# Patient Record
Sex: MALE | Race: WHITE | NOT HISPANIC OR LATINO | Employment: OTHER | URBAN - METROPOLITAN AREA
[De-identification: names, ages, dates, MRNs, and addresses within clinical notes are randomized per-mention and may not be internally consistent; named-entity substitution may affect disease eponyms.]

---

## 2021-10-03 ENCOUNTER — HOSPITAL ENCOUNTER (EMERGENCY)
Facility: HOSPITAL | Age: 61
Discharge: ANOTHER HOSPITAL | End: 2021-10-03
Attending: EMERGENCY MEDICINE | Admitting: EMERGENCY MEDICINE

## 2021-10-03 ENCOUNTER — APPOINTMENT (OUTPATIENT)
Dept: GENERAL RADIOLOGY | Facility: HOSPITAL | Age: 61
End: 2021-10-03

## 2021-10-03 VITALS
RESPIRATION RATE: 18 BRPM | DIASTOLIC BLOOD PRESSURE: 92 MMHG | WEIGHT: 255.73 LBS | HEART RATE: 85 BPM | HEIGHT: 75 IN | SYSTOLIC BLOOD PRESSURE: 137 MMHG | TEMPERATURE: 98 F | BODY MASS INDEX: 31.8 KG/M2 | OXYGEN SATURATION: 97 %

## 2021-10-03 DIAGNOSIS — I48.91 NEW ONSET ATRIAL FIBRILLATION (HCC): Primary | ICD-10-CM

## 2021-10-03 LAB
ALBUMIN SERPL-MCNC: 4.4 G/DL (ref 3.5–5.2)
ALBUMIN/GLOB SERPL: 2 G/DL
ALP SERPL-CCNC: 92 U/L (ref 39–117)
ALT SERPL W P-5'-P-CCNC: 19 U/L (ref 1–41)
ANION GAP SERPL CALCULATED.3IONS-SCNC: 9.5 MMOL/L (ref 5–15)
AST SERPL-CCNC: 19 U/L (ref 1–40)
BASOPHILS # BLD AUTO: 0.06 10*3/MM3 (ref 0–0.2)
BASOPHILS NFR BLD AUTO: 0.8 % (ref 0–1.5)
BILIRUB SERPL-MCNC: 0.5 MG/DL (ref 0–1.2)
BUN SERPL-MCNC: 13 MG/DL (ref 8–23)
BUN/CREAT SERPL: 11.4 (ref 7–25)
CALCIUM SPEC-SCNC: 9.3 MG/DL (ref 8.6–10.5)
CHLORIDE SERPL-SCNC: 105 MMOL/L (ref 98–107)
CO2 SERPL-SCNC: 24.5 MMOL/L (ref 22–29)
CREAT SERPL-MCNC: 1.14 MG/DL (ref 0.76–1.27)
DEPRECATED RDW RBC AUTO: 39.3 FL (ref 37–54)
EOSINOPHIL # BLD AUTO: 0.26 10*3/MM3 (ref 0–0.4)
EOSINOPHIL NFR BLD AUTO: 3.6 % (ref 0.3–6.2)
ERYTHROCYTE [DISTWIDTH] IN BLOOD BY AUTOMATED COUNT: 12.3 % (ref 12.3–15.4)
GFR SERPL CREATININE-BSD FRML MDRD: 66 ML/MIN/1.73
GLOBULIN UR ELPH-MCNC: 2.2 GM/DL
GLUCOSE SERPL-MCNC: 127 MG/DL (ref 65–99)
HCT VFR BLD AUTO: 45 % (ref 37.5–51)
HGB BLD-MCNC: 15.9 G/DL (ref 13–17.7)
HOLD SPECIMEN: NORMAL
HOLD SPECIMEN: NORMAL
IMM GRANULOCYTES # BLD AUTO: 0.03 10*3/MM3 (ref 0–0.05)
IMM GRANULOCYTES NFR BLD AUTO: 0.4 % (ref 0–0.5)
LYMPHOCYTES # BLD AUTO: 2.19 10*3/MM3 (ref 0.7–3.1)
LYMPHOCYTES NFR BLD AUTO: 30.7 % (ref 19.6–45.3)
MCH RBC QN AUTO: 31 PG (ref 26.6–33)
MCHC RBC AUTO-ENTMCNC: 35.3 G/DL (ref 31.5–35.7)
MCV RBC AUTO: 87.7 FL (ref 79–97)
MONOCYTES # BLD AUTO: 0.61 10*3/MM3 (ref 0.1–0.9)
MONOCYTES NFR BLD AUTO: 8.6 % (ref 5–12)
NEUTROPHILS NFR BLD AUTO: 3.98 10*3/MM3 (ref 1.7–7)
NEUTROPHILS NFR BLD AUTO: 55.9 % (ref 42.7–76)
NRBC BLD AUTO-RTO: 0 /100 WBC (ref 0–0.2)
NT-PROBNP SERPL-MCNC: 398 PG/ML (ref 0–900)
PLATELET # BLD AUTO: 206 10*3/MM3 (ref 140–450)
PMV BLD AUTO: 10.3 FL (ref 6–12)
POTASSIUM SERPL-SCNC: 4 MMOL/L (ref 3.5–5.2)
PROT SERPL-MCNC: 6.6 G/DL (ref 6–8.5)
RBC # BLD AUTO: 5.13 10*6/MM3 (ref 4.14–5.8)
SODIUM SERPL-SCNC: 139 MMOL/L (ref 136–145)
TROPONIN T SERPL-MCNC: <0.01 NG/ML (ref 0–0.03)
WBC # BLD AUTO: 7.13 10*3/MM3 (ref 3.4–10.8)
WHOLE BLOOD HOLD SPECIMEN: NORMAL
WHOLE BLOOD HOLD SPECIMEN: NORMAL

## 2021-10-03 PROCEDURE — 80053 COMPREHEN METABOLIC PANEL: CPT | Performed by: EMERGENCY MEDICINE

## 2021-10-03 PROCEDURE — 84484 ASSAY OF TROPONIN QUANT: CPT | Performed by: EMERGENCY MEDICINE

## 2021-10-03 PROCEDURE — 83880 ASSAY OF NATRIURETIC PEPTIDE: CPT | Performed by: EMERGENCY MEDICINE

## 2021-10-03 PROCEDURE — 93005 ELECTROCARDIOGRAM TRACING: CPT | Performed by: EMERGENCY MEDICINE

## 2021-10-03 PROCEDURE — 71045 X-RAY EXAM CHEST 1 VIEW: CPT

## 2021-10-03 PROCEDURE — 85025 COMPLETE CBC W/AUTO DIFF WBC: CPT | Performed by: EMERGENCY MEDICINE

## 2021-10-03 PROCEDURE — 99284 EMERGENCY DEPT VISIT MOD MDM: CPT

## 2021-10-03 RX ORDER — SODIUM CHLORIDE 0.9 % (FLUSH) 0.9 %
10 SYRINGE (ML) INJECTION AS NEEDED
Status: DISCONTINUED | OUTPATIENT
Start: 2021-10-03 | End: 2021-10-03 | Stop reason: HOSPADM

## 2021-10-03 NOTE — ED PROVIDER NOTES
Subjective   This patient presents to the emergency department complaining of chest discomfort and irregular heartbeat for the last day to 2 days.  The patient [facility] [place]  Patient states over the last couple of days he started developing palpitations he is also had intermittent chest discomfort and some shortness of breath nausea lightheadedness.          Review of Systems   Constitutional: Negative.    HENT: Negative.    Eyes: Negative.    Respiratory: Positive for chest tightness and shortness of breath. Negative for apnea, cough, choking, wheezing and stridor.    Cardiovascular: Positive for chest pain and palpitations.   Gastrointestinal: Positive for nausea. Negative for abdominal distention, abdominal pain, anal bleeding, blood in stool, constipation, diarrhea, rectal pain and vomiting.   Endocrine: Negative.    Genitourinary: Negative.    Musculoskeletal: Negative.    Skin: Negative.    Allergic/Immunologic: Negative.    Neurological: Negative.    Hematological: Negative.    Psychiatric/Behavioral: Negative.        Past Medical History:   Diagnosis Date   • Coronary artery disease        Allergies   Allergen Reactions   • Depakote [Valproic Acid] Anaphylaxis   • Simvastatin Unknown - Low Severity       Past Surgical History:   Procedure Laterality Date   • CARDIAC SURGERY         History reviewed. No pertinent family history.    Social History     Socioeconomic History   • Marital status:      Spouse name: Not on file   • Number of children: Not on file   • Years of education: Not on file   • Highest education level: Not on file   Tobacco Use   • Smoking status: Current Every Day Smoker   Substance and Sexual Activity   • Alcohol use: Not Currently   • Drug use: Yes     Types: Marijuana           Objective   Physical Exam  Constitutional:       Appearance: Normal appearance.   HENT:      Head: Normocephalic and atraumatic.      Right Ear:  External ear normal.      Left Ear: External ear normal.      Nose: Nose normal.      Mouth/Throat:      Mouth: Mucous membranes are moist.      Pharynx: Oropharynx is clear.   Eyes:      Extraocular Movements: Extraocular movements intact.      Conjunctiva/sclera: Conjunctivae normal.      Pupils: Pupils are equal, round, and reactive to light.   Cardiovascular:      Rate and Rhythm: Normal rate. Rhythm irregular.      Pulses: Normal pulses.      Heart sounds: Normal heart sounds.   Pulmonary:      Effort: Pulmonary effort is normal.      Breath sounds: Normal breath sounds.   Abdominal:      General: Abdomen is flat. Bowel sounds are normal.      Palpations: Abdomen is soft.   Musculoskeletal:         General: Normal range of motion.      Cervical back: Normal range of motion and neck supple.   Skin:     General: Skin is warm and dry.      Capillary Refill: Capillary refill takes less than 2 seconds.   Neurological:      General: No focal deficit present.      Mental Status: He is alert and oriented to person, place, and time.   Psychiatric:         Mood and Affect: Mood normal.         Behavior: Behavior normal.         Thought Content: Thought content normal.         Procedures           ED Course    Labs Reviewed   COMPREHENSIVE METABOLIC PANEL - Abnormal; Notable for the following components:       Result Value    Glucose 127 (*)     All other components within normal limits    Narrative:     GFR Normal >60  Chronic Kidney Disease <60  Kidney Failure <15     BNP (IN-HOUSE) - Normal    Narrative:     Among patients with dyspnea, NT-proBNP is highly sensitive for the detection of acute congestive heart failure. In addition NT-proBNP of <300 pg/ml effectively rules out acute congestive heart failure with 99% negative predictive value.    Results may be falsely decreased if patient taking Biotin.     TROPONIN (IN-HOUSE) - Normal    Narrative:     Troponin T Reference Range:  <= 0.03 ng/mL-   Negative for  AMI  >0.03 ng/mL-     Abnormal for myocardial necrosis.  Clinicians would have to utilize clinical acumen, EKG, Troponin and serial changes to determine if it is an Acute Myocardial Infarction or myocardial injury due to an underlying chronic condition.       Results may be falsely decreased if patient taking Biotin.     CBC WITH AUTO DIFFERENTIAL - Normal   RAINBOW DRAW    Narrative:     The following orders were created for panel order [place] Draw.  Procedure                               Abnormality         Status                     ---------                               -----------         ------                     Green Top (Gel)[[id number]]                                  Final result               Lavender Top[[id number]]                                     Final result               Gold Top - SST[[id number]]                                   Final result               Light Blue Top[[id number]]                                   Final result                 Please view results for these tests on the individual orders.   CBC AND DIFFERENTIAL    Narrative:     The following orders were created for panel order CBC & Differential.  Procedure                               Abnormality         Status                     ---------                               -----------         ------                     CBC Auto Differential[[id number]]        Normal              Final result                 Please view results for these tests on the individual orders.   GREEN TOP   LAVENDER TOP   GOLD TOP - SST   LIGHT BLUE TOP     XR Chest 1 View    Result Date: 10/3/2021  Narrative: PROCEDURE: XR CHEST 1 VW  COMPARISON: [facility], CR, CHEST PA/AP & LAT 2V, 12/14/2016, 21:16.  INDICATIONS: SOA  FINDINGS:  There are bibasilar areas of atelectasis.  The heart is not enlarged.  There are no pleural effusions.  CONCLUSION:  1. There are some atelectatic changes in the lower lungs.       MANINDER GIBSON MD        Electronically Signed and Approved By: MANINDER GIBSON MD on 10/03/2021 at 18:36               EKG: Atrial fibrillation with a rate of 76  Abnormal P wave and UT interval  Abnormal QRS  Normal axis  Nonspecific ST changes  Normal QT QTC                                           MDM  Number of Diagnoses or Management Options  New onset atrial fibrillation (HCC)  Diagnosis management comments: I spoke with Dr. Agustin Valadez at the [facility] who accepts the patient for transfer.  I did review his prior records with Dr. Valadez.  I also spoke with patient's wife.       Amount and/or Complexity of Data Reviewed  Clinical lab tests: reviewed  Tests in the radiology section of CPT®: reviewed  Tests in the medicine section of CPT®: reviewed  Decide to obtain previous medical records or to obtain history from someone other than the patient: yes  Obtain history from someone other than the patient: yes  Review and summarize past medical records: yes  Discuss the patient with other providers: yes  Independent visualization of images, tracings, or specimens: yes    Patient Progress  Patient progress: stable      Final diagnoses:   New onset atrial fibrillation (HCC)       ED Disposition  ED Disposition     ED Disposition Condition Comment    Transfer to Another Facility   [facility] [place]          No follow-up provider specified.       Medication List      No changes were made to your prescriptions during this visit.          Alexander Garcia, DO  10/04/21 1454

## 2021-10-21 LAB — QT INTERVAL: 375 MS

## 2021-11-18 ENCOUNTER — OFFICE VISIT (OUTPATIENT)
Dept: CARDIAC REHAB | Facility: HOSPITAL | Age: 61
End: 2021-11-18

## 2021-11-18 VITALS — DIASTOLIC BLOOD PRESSURE: 80 MMHG | SYSTOLIC BLOOD PRESSURE: 140 MMHG | HEART RATE: 64 BPM

## 2021-11-18 DIAGNOSIS — Z98.61 PERCUTANEOUS TRANSLUMINAL CORONARY ANGIOPLASTY (PTCA) WITHIN LAST 14 TO 24 MONTHS: Primary | ICD-10-CM

## 2021-11-18 PROCEDURE — 93798 PHYS/QHP OP CAR RHAB W/ECG: CPT

## 2021-11-18 RX ORDER — LITHIUM CARBONATE 300 MG/1
450 CAPSULE ORAL 2 TIMES DAILY WITH MEALS
COMMUNITY

## 2021-11-18 RX ORDER — OMEPRAZOLE 20 MG/1
20 CAPSULE, DELAYED RELEASE ORAL DAILY
COMMUNITY

## 2021-11-18 RX ORDER — NICOTINE 21 MG/24HR
1 PATCH, TRANSDERMAL 24 HOURS TRANSDERMAL EVERY 24 HOURS
COMMUNITY

## 2021-11-18 RX ORDER — LAMOTRIGINE 100 MG/1
150 TABLET ORAL DAILY
COMMUNITY

## 2021-11-18 RX ORDER — NITROGLYCERIN 0.4 MG/1
0.4 TABLET SUBLINGUAL
COMMUNITY

## 2021-11-18 RX ORDER — CARVEDILOL 12.5 MG/1
12.5 TABLET ORAL 2 TIMES DAILY WITH MEALS
COMMUNITY

## 2021-11-18 RX ORDER — AMLODIPINE BESYLATE 5 MG/1
5 TABLET ORAL DAILY
COMMUNITY

## 2021-11-18 RX ORDER — ALBUTEROL SULFATE 2.5 MG/3ML
2.5 SOLUTION RESPIRATORY (INHALATION) EVERY 4 HOURS PRN
COMMUNITY

## 2021-11-18 RX ORDER — CHLORAL HYDRATE 500 MG
CAPSULE ORAL
COMMUNITY

## 2021-11-18 RX ORDER — ROSUVASTATIN CALCIUM 20 MG/1
40 TABLET, COATED ORAL DAILY
COMMUNITY

## 2021-11-18 RX ORDER — CLOPIDOGREL BISULFATE 75 MG/1
75 TABLET ORAL DAILY
COMMUNITY

## 2021-11-18 RX ORDER — TERAZOSIN 5 MG/1
5 CAPSULE ORAL NIGHTLY
COMMUNITY

## 2021-11-18 NOTE — PROGRESS NOTES
Chief Complaint  Cardiac Rehab Phase II    Randy Chaves presents to [facility] CARDIOPULMONARY REHABILITATION    Physical Exam   Result Review :          Assessment and Plan    Diagnoses and all orders for this visit:    1. Percutaneous transluminal coronary angioplasty (PTCA) within last 14 to 24 months (Primary)        Xiomara Ratliff RN

## 2021-11-18 NOTE — PROGRESS NOTES
Chief Complaint  Cardiac Rehab Phase II    Randy Chaves presents to [facility] CARDIOPULMONARY REHABILITATION    Physical Exam  Vitals reviewed.   Constitutional:       Appearance: Normal appearance. He is normal weight.   Cardiovascular:      Rate and Rhythm: Normal rate and regular rhythm.      Heart sounds: Normal heart sounds.   Neurological:      Mental Status: He is alert and oriented to person, place, and time.        Result Review :          Assessment and Plan    Diagnoses and all orders for this visit:    1. Percutaneous transluminal coronary angioplasty (PTCA) within last 14 to 24 months (Primary)        Xiomara Ratliff RN

## 2021-11-29 ENCOUNTER — TREATMENT (OUTPATIENT)
Dept: CARDIAC REHAB | Facility: HOSPITAL | Age: 61
End: 2021-11-29

## 2021-11-29 DIAGNOSIS — Z98.61 PERCUTANEOUS TRANSLUMINAL CORONARY ANGIOPLASTY (PTCA) WITHIN LAST 14 TO 24 MONTHS: Primary | ICD-10-CM

## 2021-11-29 PROCEDURE — 93798 PHYS/QHP OP CAR RHAB W/ECG: CPT

## 2021-12-06 ENCOUNTER — APPOINTMENT (OUTPATIENT)
Dept: CARDIAC REHAB | Facility: HOSPITAL | Age: 61
End: 2021-12-06

## 2021-12-10 ENCOUNTER — TELEPHONE (OUTPATIENT)
Dept: CARDIAC REHAB | Facility: HOSPITAL | Age: 61
End: 2021-12-10

## 2021-12-10 NOTE — TELEPHONE ENCOUNTER
"1325-Phone call placed to pt concerning his attendance to cardiac rehab.  Wife states \"He won't be there because he is having problems with his BP and will see MD next week.\"  "

## 2021-12-15 ENCOUNTER — APPOINTMENT (OUTPATIENT)
Dept: CARDIAC REHAB | Facility: HOSPITAL | Age: 61
End: 2021-12-15

## 2022-01-17 ENCOUNTER — APPOINTMENT (OUTPATIENT)
Dept: CARDIAC REHAB | Facility: HOSPITAL | Age: 62
End: 2022-01-17

## 2022-01-19 ENCOUNTER — APPOINTMENT (OUTPATIENT)
Dept: CARDIAC REHAB | Facility: HOSPITAL | Age: 62
End: 2022-01-19

## 2022-01-21 ENCOUNTER — APPOINTMENT (OUTPATIENT)
Dept: CARDIAC REHAB | Facility: HOSPITAL | Age: 62
End: 2022-01-21

## 2022-01-24 ENCOUNTER — APPOINTMENT (OUTPATIENT)
Dept: CARDIAC REHAB | Facility: HOSPITAL | Age: 62
End: 2022-01-24

## 2022-01-26 ENCOUNTER — APPOINTMENT (OUTPATIENT)
Dept: CARDIAC REHAB | Facility: HOSPITAL | Age: 62
End: 2022-01-26

## 2022-01-28 ENCOUNTER — APPOINTMENT (OUTPATIENT)
Dept: CARDIAC REHAB | Facility: HOSPITAL | Age: 62
End: 2022-01-28

## 2022-01-31 ENCOUNTER — APPOINTMENT (OUTPATIENT)
Dept: CARDIAC REHAB | Facility: HOSPITAL | Age: 62
End: 2022-01-31

## 2022-02-02 ENCOUNTER — APPOINTMENT (OUTPATIENT)
Dept: CARDIAC REHAB | Facility: HOSPITAL | Age: 62
End: 2022-02-02

## 2022-02-04 ENCOUNTER — APPOINTMENT (OUTPATIENT)
Dept: CARDIAC REHAB | Facility: HOSPITAL | Age: 62
End: 2022-02-04

## 2022-02-07 ENCOUNTER — APPOINTMENT (OUTPATIENT)
Dept: CARDIAC REHAB | Facility: HOSPITAL | Age: 62
End: 2022-02-07

## 2022-02-09 ENCOUNTER — APPOINTMENT (OUTPATIENT)
Dept: CARDIAC REHAB | Facility: HOSPITAL | Age: 62
End: 2022-02-09

## 2022-02-11 ENCOUNTER — APPOINTMENT (OUTPATIENT)
Dept: CARDIAC REHAB | Facility: HOSPITAL | Age: 62
End: 2022-02-11

## 2022-02-14 ENCOUNTER — APPOINTMENT (OUTPATIENT)
Dept: CARDIAC REHAB | Facility: HOSPITAL | Age: 62
End: 2022-02-14

## 2022-02-16 ENCOUNTER — APPOINTMENT (OUTPATIENT)
Dept: CARDIAC REHAB | Facility: HOSPITAL | Age: 62
End: 2022-02-16

## 2022-02-18 ENCOUNTER — APPOINTMENT (OUTPATIENT)
Dept: CARDIAC REHAB | Facility: HOSPITAL | Age: 62
End: 2022-02-18

## 2022-04-10 ENCOUNTER — HOSPITAL ENCOUNTER (OUTPATIENT)
Facility: HOSPITAL | Age: 62
Setting detail: OBSERVATION
LOS: 1 days | Discharge: HOME | End: 2022-04-11
Attending: EMERGENCY MEDICINE | Admitting: INTERNAL MEDICINE

## 2022-04-10 ENCOUNTER — APPOINTMENT (OUTPATIENT)
Dept: GENERAL RADIOLOGY | Facility: HOSPITAL | Age: 62
End: 2022-04-10

## 2022-04-10 DIAGNOSIS — I48.91 ATRIAL FIBRILLATION, UNSPECIFIED TYPE: ICD-10-CM

## 2022-04-10 DIAGNOSIS — J44.9 CHRONIC OBSTRUCTIVE PULMONARY DISEASE, UNSPECIFIED COPD TYPE: ICD-10-CM

## 2022-04-10 DIAGNOSIS — R07.9 CHEST PAIN, UNSPECIFIED TYPE: Primary | ICD-10-CM

## 2022-04-10 DIAGNOSIS — I20.0 UNSTABLE ANGINA: ICD-10-CM

## 2022-04-10 LAB
ALBUMIN SERPL-MCNC: 4.8 G/DL (ref 3.5–5.2)
ALBUMIN/GLOB SERPL: 1.8 G/DL
ALP SERPL-CCNC: 107 U/L (ref 39–117)
ALT SERPL W P-5'-P-CCNC: 23 U/L (ref 1–41)
ANION GAP SERPL CALCULATED.3IONS-SCNC: 8.6 MMOL/L (ref 5–15)
AST SERPL-CCNC: 17 U/L (ref 1–40)
BASOPHILS # BLD AUTO: 0.07 10*3/MM3 (ref 0–0.2)
BASOPHILS NFR BLD AUTO: 0.8 % (ref 0–1.5)
BILIRUB SERPL-MCNC: 0.5 MG/DL (ref 0–1.2)
BUN SERPL-MCNC: 16 MG/DL (ref 8–23)
BUN/CREAT SERPL: 12.5 (ref 7–25)
CALCIUM SPEC-SCNC: 10.2 MG/DL (ref 8.6–10.5)
CHLORIDE SERPL-SCNC: 102 MMOL/L (ref 98–107)
CK MB SERPL-CCNC: 3.42 NG/ML
CK SERPL-CCNC: 88 U/L (ref 20–200)
CO2 SERPL-SCNC: 28.4 MMOL/L (ref 22–29)
CREAT SERPL-MCNC: 1.28 MG/DL (ref 0.76–1.27)
DEPRECATED RDW RBC AUTO: 39 FL (ref 37–54)
EGFRCR SERPLBLD CKD-EPI 2021: 64.1 ML/MIN/1.73
EOSINOPHIL # BLD AUTO: 0.19 10*3/MM3 (ref 0–0.4)
EOSINOPHIL NFR BLD AUTO: 2.1 % (ref 0.3–6.2)
ERYTHROCYTE [DISTWIDTH] IN BLOOD BY AUTOMATED COUNT: 12.7 % (ref 12.3–15.4)
GLOBULIN UR ELPH-MCNC: 2.7 GM/DL
GLUCOSE SERPL-MCNC: 109 MG/DL (ref 65–99)
HCT VFR BLD AUTO: 47.5 % (ref 37.5–51)
HGB BLD-MCNC: 16.4 G/DL (ref 13–17.7)
HOLD SPECIMEN: NORMAL
HOLD SPECIMEN: NORMAL
IMM GRANULOCYTES # BLD AUTO: 0.03 10*3/MM3 (ref 0–0.05)
IMM GRANULOCYTES NFR BLD AUTO: 0.3 % (ref 0–0.5)
LIPASE SERPL-CCNC: 51 U/L (ref 13–60)
LYMPHOCYTES # BLD AUTO: 2.42 10*3/MM3 (ref 0.7–3.1)
LYMPHOCYTES NFR BLD AUTO: 26.1 % (ref 19.6–45.3)
MAGNESIUM SERPL-MCNC: 2 MG/DL (ref 1.6–2.4)
MCH RBC QN AUTO: 29.4 PG (ref 26.6–33)
MCHC RBC AUTO-ENTMCNC: 34.5 G/DL (ref 31.5–35.7)
MCV RBC AUTO: 85.3 FL (ref 79–97)
MONOCYTES # BLD AUTO: 0.86 10*3/MM3 (ref 0.1–0.9)
MONOCYTES NFR BLD AUTO: 9.3 % (ref 5–12)
NEUTROPHILS NFR BLD AUTO: 5.69 10*3/MM3 (ref 1.7–7)
NEUTROPHILS NFR BLD AUTO: 61.4 % (ref 42.7–76)
NRBC BLD AUTO-RTO: 0 /100 WBC (ref 0–0.2)
NT-PROBNP SERPL-MCNC: 121 PG/ML (ref 0–900)
PLATELET # BLD AUTO: 255 10*3/MM3 (ref 140–450)
PMV BLD AUTO: 9.7 FL (ref 6–12)
POTASSIUM SERPL-SCNC: 3.9 MMOL/L (ref 3.5–5.2)
PROT SERPL-MCNC: 7.5 G/DL (ref 6–8.5)
RBC # BLD AUTO: 5.57 10*6/MM3 (ref 4.14–5.8)
SODIUM SERPL-SCNC: 139 MMOL/L (ref 136–145)
TROPONIN I SERPL-MCNC: 0 NG/ML (ref 0–0.6)
TROPONIN I SERPL-MCNC: 0 NG/ML (ref 0–0.6)
WBC NRBC COR # BLD: 9.26 10*3/MM3 (ref 3.4–10.8)
WHOLE BLOOD HOLD SPECIMEN: NORMAL
WHOLE BLOOD HOLD SPECIMEN: NORMAL

## 2022-04-10 PROCEDURE — 84484 ASSAY OF TROPONIN QUANT: CPT

## 2022-04-10 PROCEDURE — G0378 HOSPITAL OBSERVATION PER HR: HCPCS

## 2022-04-10 PROCEDURE — 82553 CREATINE MB FRACTION: CPT | Performed by: EMERGENCY MEDICINE

## 2022-04-10 PROCEDURE — 85025 COMPLETE CBC W/AUTO DIFF WBC: CPT | Performed by: EMERGENCY MEDICINE

## 2022-04-10 PROCEDURE — 93005 ELECTROCARDIOGRAM TRACING: CPT

## 2022-04-10 PROCEDURE — 80053 COMPREHEN METABOLIC PANEL: CPT

## 2022-04-10 PROCEDURE — 93005 ELECTROCARDIOGRAM TRACING: CPT | Performed by: EMERGENCY MEDICINE

## 2022-04-10 PROCEDURE — 71045 X-RAY EXAM CHEST 1 VIEW: CPT

## 2022-04-10 PROCEDURE — 83880 ASSAY OF NATRIURETIC PEPTIDE: CPT

## 2022-04-10 PROCEDURE — 83735 ASSAY OF MAGNESIUM: CPT

## 2022-04-10 PROCEDURE — 36415 COLL VENOUS BLD VENIPUNCTURE: CPT | Performed by: EMERGENCY MEDICINE

## 2022-04-10 PROCEDURE — 82550 ASSAY OF CK (CPK): CPT | Performed by: EMERGENCY MEDICINE

## 2022-04-10 PROCEDURE — 99284 EMERGENCY DEPT VISIT MOD MDM: CPT

## 2022-04-10 PROCEDURE — 83690 ASSAY OF LIPASE: CPT

## 2022-04-10 RX ORDER — CARVEDILOL 12.5 MG/1
12.5 TABLET ORAL 2 TIMES DAILY WITH MEALS
Status: ON HOLD | COMMUNITY
End: 2022-04-11 | Stop reason: SDUPTHER

## 2022-04-10 RX ORDER — LAMOTRIGINE 100 MG/1
150 TABLET ORAL NIGHTLY
COMMUNITY

## 2022-04-10 RX ORDER — CLOPIDOGREL BISULFATE 75 MG/1
75 TABLET ORAL DAILY
COMMUNITY

## 2022-04-10 RX ORDER — SODIUM CHLORIDE 0.9 % (FLUSH) 0.9 %
10 SYRINGE (ML) INJECTION AS NEEDED
Status: DISCONTINUED | OUTPATIENT
Start: 2022-04-10 | End: 2022-04-11 | Stop reason: HOSPADM

## 2022-04-10 RX ORDER — AMLODIPINE BESYLATE AND BENAZEPRIL HYDROCHLORIDE 10; 20 MG/1; MG/1
1 CAPSULE ORAL DAILY
COMMUNITY

## 2022-04-10 RX ORDER — NITROGLYCERIN 0.4 MG/1
0.4 TABLET SUBLINGUAL
COMMUNITY

## 2022-04-10 RX ORDER — FOLIC ACID 1 MG/1
1 TABLET ORAL DAILY
COMMUNITY

## 2022-04-10 RX ORDER — CHLORAL HYDRATE 500 MG
500 CAPSULE ORAL 2 TIMES DAILY WITH MEALS
COMMUNITY

## 2022-04-10 RX ORDER — TERAZOSIN 5 MG/1
5 CAPSULE ORAL NIGHTLY
COMMUNITY

## 2022-04-10 RX ORDER — ASPIRIN 81 MG/1
324 TABLET, CHEWABLE ORAL ONCE
Status: COMPLETED | OUTPATIENT
Start: 2022-04-10 | End: 2022-04-10

## 2022-04-10 RX ORDER — ASPIRIN 81 MG/1
81 TABLET ORAL DAILY
COMMUNITY
End: 2022-04-10

## 2022-04-10 RX ORDER — ZOLPIDEM TARTRATE 10 MG/1
10 TABLET ORAL NIGHTLY PRN
COMMUNITY

## 2022-04-10 RX ORDER — LITHIUM CARBONATE 450 MG
450 TABLET, EXTENDED RELEASE ORAL 2 TIMES DAILY
COMMUNITY

## 2022-04-10 RX ORDER — ROSUVASTATIN CALCIUM 20 MG/1
40 TABLET, COATED ORAL DAILY
COMMUNITY

## 2022-04-10 RX ORDER — OMEPRAZOLE 20 MG/1
40 CAPSULE, DELAYED RELEASE ORAL DAILY
COMMUNITY

## 2022-04-10 RX ADMIN — ASPIRIN 81 MG CHEWABLE TABLET 324 MG: 81 TABLET CHEWABLE at 19:55

## 2022-04-10 RX ADMIN — NITROGLYCERIN 0.5 INCH: 20 OINTMENT TOPICAL at 22:35

## 2022-04-11 VITALS
WEIGHT: 241.62 LBS | DIASTOLIC BLOOD PRESSURE: 73 MMHG | OXYGEN SATURATION: 100 % | HEART RATE: 78 BPM | RESPIRATION RATE: 18 BRPM | TEMPERATURE: 97.4 F | HEIGHT: 75 IN | BODY MASS INDEX: 30.04 KG/M2 | SYSTOLIC BLOOD PRESSURE: 114 MMHG

## 2022-04-11 PROBLEM — R07.9 CHEST PAIN, UNSPECIFIED TYPE: Status: ACTIVE | Noted: 2022-04-11

## 2022-04-11 LAB
ALBUMIN SERPL-MCNC: 4.3 G/DL (ref 3.5–5.2)
ALBUMIN/GLOB SERPL: 1.9 G/DL
ALP SERPL-CCNC: 99 U/L (ref 39–117)
ALT SERPL W P-5'-P-CCNC: 20 U/L (ref 1–41)
ANION GAP SERPL CALCULATED.3IONS-SCNC: 12.3 MMOL/L (ref 5–15)
AST SERPL-CCNC: 14 U/L (ref 1–40)
BASOPHILS # BLD AUTO: 0.04 10*3/MM3 (ref 0–0.2)
BASOPHILS NFR BLD AUTO: 0.6 % (ref 0–1.5)
BILIRUB SERPL-MCNC: 0.6 MG/DL (ref 0–1.2)
BUN SERPL-MCNC: 15 MG/DL (ref 8–23)
BUN/CREAT SERPL: 13.9 (ref 7–25)
CALCIUM SPEC-SCNC: 9.8 MG/DL (ref 8.6–10.5)
CHLORIDE SERPL-SCNC: 103 MMOL/L (ref 98–107)
CHOLEST SERPL-MCNC: 100 MG/DL (ref 0–200)
CO2 SERPL-SCNC: 23.7 MMOL/L (ref 22–29)
CREAT SERPL-MCNC: 1.08 MG/DL (ref 0.76–1.27)
DEPRECATED RDW RBC AUTO: 37.8 FL (ref 37–54)
EGFRCR SERPLBLD CKD-EPI 2021: 78.6 ML/MIN/1.73
EOSINOPHIL # BLD AUTO: 0.18 10*3/MM3 (ref 0–0.4)
EOSINOPHIL NFR BLD AUTO: 2.7 % (ref 0.3–6.2)
ERYTHROCYTE [DISTWIDTH] IN BLOOD BY AUTOMATED COUNT: 12.5 % (ref 12.3–15.4)
GLOBULIN UR ELPH-MCNC: 2.3 GM/DL
GLUCOSE SERPL-MCNC: 162 MG/DL (ref 65–99)
HBA1C MFR BLD: 5.7 % (ref 4.8–5.6)
HCT VFR BLD AUTO: 44.2 % (ref 37.5–51)
HDLC SERPL-MCNC: 34 MG/DL (ref 40–60)
HGB BLD-MCNC: 15.5 G/DL (ref 13–17.7)
HOLD SPECIMEN: NORMAL
IMM GRANULOCYTES # BLD AUTO: 0.02 10*3/MM3 (ref 0–0.05)
IMM GRANULOCYTES NFR BLD AUTO: 0.3 % (ref 0–0.5)
INR PPP: 1.09 (ref 2–3)
LDLC SERPL CALC-MCNC: 44 MG/DL (ref 0–100)
LDLC/HDLC SERPL: 1.21 {RATIO}
LITHIUM SERPL-SCNC: 0.2 MMOL/L (ref 0.6–1.2)
LYMPHOCYTES # BLD AUTO: 1.96 10*3/MM3 (ref 0.7–3.1)
LYMPHOCYTES NFR BLD AUTO: 29.1 % (ref 19.6–45.3)
MAGNESIUM SERPL-MCNC: 1.9 MG/DL (ref 1.6–2.4)
MCH RBC QN AUTO: 29.4 PG (ref 26.6–33)
MCHC RBC AUTO-ENTMCNC: 35.1 G/DL (ref 31.5–35.7)
MCV RBC AUTO: 83.9 FL (ref 79–97)
MONOCYTES # BLD AUTO: 0.57 10*3/MM3 (ref 0.1–0.9)
MONOCYTES NFR BLD AUTO: 8.5 % (ref 5–12)
NEUTROPHILS NFR BLD AUTO: 3.97 10*3/MM3 (ref 1.7–7)
NEUTROPHILS NFR BLD AUTO: 58.8 % (ref 42.7–76)
NRBC BLD AUTO-RTO: 0 /100 WBC (ref 0–0.2)
PHOSPHATE SERPL-MCNC: 2.9 MG/DL (ref 2.5–4.5)
PLATELET # BLD AUTO: 215 10*3/MM3 (ref 140–450)
PMV BLD AUTO: 9.9 FL (ref 6–12)
POTASSIUM SERPL-SCNC: 3.3 MMOL/L (ref 3.5–5.2)
PROT SERPL-MCNC: 6.6 G/DL (ref 6–8.5)
PROTHROMBIN TIME: 11.3 SECONDS (ref 9.4–12)
QT INTERVAL: 358 MS
QT INTERVAL: 365 MS
RBC # BLD AUTO: 5.27 10*6/MM3 (ref 4.14–5.8)
SODIUM SERPL-SCNC: 139 MMOL/L (ref 136–145)
TRIGL SERPL-MCNC: 125 MG/DL (ref 0–150)
TROPONIN T SERPL-MCNC: <0.01 NG/ML (ref 0–0.03)
VLDLC SERPL-MCNC: 22 MG/DL (ref 5–40)
WBC NRBC COR # BLD: 6.74 10*3/MM3 (ref 3.4–10.8)

## 2022-04-11 PROCEDURE — G0378 HOSPITAL OBSERVATION PER HR: HCPCS

## 2022-04-11 PROCEDURE — 85610 PROTHROMBIN TIME: CPT | Performed by: GENERAL PRACTICE

## 2022-04-11 PROCEDURE — 84100 ASSAY OF PHOSPHORUS: CPT | Performed by: GENERAL PRACTICE

## 2022-04-11 PROCEDURE — 85025 COMPLETE CBC W/AUTO DIFF WBC: CPT | Performed by: GENERAL PRACTICE

## 2022-04-11 PROCEDURE — 83036 HEMOGLOBIN GLYCOSYLATED A1C: CPT | Performed by: GENERAL PRACTICE

## 2022-04-11 PROCEDURE — 80178 ASSAY OF LITHIUM: CPT | Performed by: GENERAL PRACTICE

## 2022-04-11 PROCEDURE — 83735 ASSAY OF MAGNESIUM: CPT | Performed by: GENERAL PRACTICE

## 2022-04-11 PROCEDURE — 80053 COMPREHEN METABOLIC PANEL: CPT | Performed by: GENERAL PRACTICE

## 2022-04-11 PROCEDURE — 99235 HOSP IP/OBS SAME DATE MOD 70: CPT | Performed by: INTERNAL MEDICINE

## 2022-04-11 PROCEDURE — 99244 OFF/OP CNSLTJ NEW/EST MOD 40: CPT | Performed by: INTERNAL MEDICINE

## 2022-04-11 PROCEDURE — 80061 LIPID PANEL: CPT | Performed by: GENERAL PRACTICE

## 2022-04-11 PROCEDURE — 84484 ASSAY OF TROPONIN QUANT: CPT | Performed by: GENERAL PRACTICE

## 2022-04-11 RX ORDER — CLOPIDOGREL BISULFATE 75 MG/1
75 TABLET ORAL DAILY
Status: DISCONTINUED | OUTPATIENT
Start: 2022-04-11 | End: 2022-04-11 | Stop reason: HOSPADM

## 2022-04-11 RX ORDER — CARVEDILOL 12.5 MG/1
12.5 TABLET ORAL 2 TIMES DAILY WITH MEALS
Status: DISCONTINUED | OUTPATIENT
Start: 2022-04-11 | End: 2022-04-11 | Stop reason: HOSPADM

## 2022-04-11 RX ORDER — TERAZOSIN 5 MG/1
5 CAPSULE ORAL NIGHTLY
Status: DISCONTINUED | OUTPATIENT
Start: 2022-04-11 | End: 2022-04-11 | Stop reason: HOSPADM

## 2022-04-11 RX ORDER — AMLODIPINE BESYLATE 5 MG/1
5 TABLET ORAL
Status: DISCONTINUED | OUTPATIENT
Start: 2022-04-11 | End: 2022-04-11 | Stop reason: HOSPADM

## 2022-04-11 RX ORDER — BISACODYL 5 MG/1
5 TABLET, DELAYED RELEASE ORAL DAILY PRN
Status: DISCONTINUED | OUTPATIENT
Start: 2022-04-11 | End: 2022-04-11 | Stop reason: HOSPADM

## 2022-04-11 RX ORDER — ZOLPIDEM TARTRATE 5 MG/1
10 TABLET ORAL NIGHTLY PRN
Status: DISCONTINUED | OUTPATIENT
Start: 2022-04-11 | End: 2022-04-11 | Stop reason: HOSPADM

## 2022-04-11 RX ORDER — POLYETHYLENE GLYCOL 3350 17 G/17G
17 POWDER, FOR SOLUTION ORAL DAILY PRN
Status: DISCONTINUED | OUTPATIENT
Start: 2022-04-11 | End: 2022-04-11 | Stop reason: HOSPADM

## 2022-04-11 RX ORDER — POTASSIUM CHLORIDE 750 MG/1
40 CAPSULE, EXTENDED RELEASE ORAL EVERY 4 HOURS
Status: COMPLETED | OUTPATIENT
Start: 2022-04-11 | End: 2022-04-11

## 2022-04-11 RX ORDER — FOLIC ACID 1 MG/1
1 TABLET ORAL DAILY
Status: DISCONTINUED | OUTPATIENT
Start: 2022-04-11 | End: 2022-04-11 | Stop reason: HOSPADM

## 2022-04-11 RX ORDER — AMOXICILLIN 250 MG
2 CAPSULE ORAL 2 TIMES DAILY
Status: DISCONTINUED | OUTPATIENT
Start: 2022-04-11 | End: 2022-04-11 | Stop reason: HOSPADM

## 2022-04-11 RX ORDER — BISACODYL 10 MG
10 SUPPOSITORY, RECTAL RECTAL DAILY PRN
Status: DISCONTINUED | OUTPATIENT
Start: 2022-04-11 | End: 2022-04-11 | Stop reason: HOSPADM

## 2022-04-11 RX ORDER — ASPIRIN 81 MG/1
81 TABLET ORAL DAILY
Status: DISCONTINUED | OUTPATIENT
Start: 2022-04-11 | End: 2022-04-11 | Stop reason: HOSPADM

## 2022-04-11 RX ORDER — ROSUVASTATIN CALCIUM 20 MG/1
40 TABLET, COATED ORAL DAILY
Status: DISCONTINUED | OUTPATIENT
Start: 2022-04-11 | End: 2022-04-11 | Stop reason: HOSPADM

## 2022-04-11 RX ORDER — LITHIUM CARBONATE 450 MG
450 TABLET, EXTENDED RELEASE ORAL 2 TIMES DAILY
Status: DISCONTINUED | OUTPATIENT
Start: 2022-04-11 | End: 2022-04-11 | Stop reason: HOSPADM

## 2022-04-11 RX ORDER — LAMOTRIGINE 100 MG/1
150 TABLET ORAL NIGHTLY
Status: DISCONTINUED | OUTPATIENT
Start: 2022-04-11 | End: 2022-04-11 | Stop reason: HOSPADM

## 2022-04-11 RX ORDER — SODIUM CHLORIDE 0.9 % (FLUSH) 0.9 %
10 SYRINGE (ML) INJECTION AS NEEDED
Status: DISCONTINUED | OUTPATIENT
Start: 2022-04-11 | End: 2022-04-11 | Stop reason: HOSPADM

## 2022-04-11 RX ORDER — ONDANSETRON 4 MG/1
4 TABLET, FILM COATED ORAL EVERY 6 HOURS PRN
Status: DISCONTINUED | OUTPATIENT
Start: 2022-04-11 | End: 2022-04-11 | Stop reason: HOSPADM

## 2022-04-11 RX ORDER — CARVEDILOL 12.5 MG/1
12.5 TABLET ORAL 2 TIMES DAILY WITH MEALS
Qty: 60 TABLET | Refills: 2 | Status: SHIPPED | OUTPATIENT
Start: 2022-04-11 | End: 2022-05-11

## 2022-04-11 RX ORDER — SODIUM CHLORIDE 0.9 % (FLUSH) 0.9 %
10 SYRINGE (ML) INJECTION EVERY 12 HOURS SCHEDULED
Status: DISCONTINUED | OUTPATIENT
Start: 2022-04-11 | End: 2022-04-11 | Stop reason: HOSPADM

## 2022-04-11 RX ADMIN — CARVEDILOL 12.5 MG: 12.5 TABLET, FILM COATED ORAL at 08:36

## 2022-04-11 RX ADMIN — APIXABAN 5 MG: 5 TABLET, FILM COATED ORAL at 08:36

## 2022-04-11 RX ADMIN — POTASSIUM CHLORIDE 40 MEQ: 750 CAPSULE, EXTENDED RELEASE ORAL at 12:27

## 2022-04-11 RX ADMIN — ROSUVASTATIN CALCIUM 40 MG: 20 TABLET, FILM COATED ORAL at 08:35

## 2022-04-11 RX ADMIN — Medication 10 ML: at 02:51

## 2022-04-11 RX ADMIN — AMLODIPINE BESYLATE 5 MG: 5 TABLET ORAL at 08:36

## 2022-04-11 RX ADMIN — POTASSIUM CHLORIDE 40 MEQ: 750 CAPSULE, EXTENDED RELEASE ORAL at 09:11

## 2022-04-11 RX ADMIN — FOLIC ACID 1 MG: 1 TABLET ORAL at 08:36

## 2022-04-11 RX ADMIN — LITHIUM CARBONATE 450 MG: 450 TABLET ORAL at 08:36

## 2022-04-11 RX ADMIN — TERAZOSIN HYDROCHLORIDE 5 MG: 5 CAPSULE ORAL at 02:48

## 2022-04-11 RX ADMIN — LAMOTRIGINE 150 MG: 100 TABLET ORAL at 02:48

## 2022-04-11 RX ADMIN — ASPIRIN 81 MG: 81 TABLET, COATED ORAL at 09:11

## 2022-04-11 RX ADMIN — Medication 10 ML: at 08:36

## 2022-04-11 RX ADMIN — CLOPIDOGREL BISULFATE 75 MG: 75 TABLET ORAL at 08:36

## 2022-04-11 NOTE — ED PROVIDER NOTES
Time: 9:17 PM EDT  Arrived by: private car  Chief Complaint: Multiple   History provided by: Patient  History is limited by: N/A     History of Present Illness:  Patient is a 61 y.o. year old male that presents to the emergency department with intermittent near syncopal episodes for 1 week. Heart burn/chest and difficulty swallowing discomfort intermittent for 1 week. He also c/o SOA with exertion, diaphoresis, nausea, and a new dry cough. He has right arm pain from shoulder to forearm. He denies fever, chills, diffuse muscle aches, vomiting, loss of taste and smell, abd pain, unilateral leg pain, unilateral leg swelling. He is being treated for HDL. He has a Hx of HTN, Afib, CAD. He is a smoker. PSHx of stents. FHx CAD. [facility]   Pt does not have a cardiologist here. Pt has been unable to get Carvedilol refill since February.       History provided by:  Patient   used: No        Similar Symptoms Previously: Yes  Recently seen: Yes      Patient Care Team  Primary Care Provider: Anna Jacobson MD    Past Medical History:     Allergies   Allergen Reactions   • Depakote [Valproic Acid] Swelling   • Simvastatin Unknown - Low Severity     Past Medical History:   Diagnosis Date   • A-fib ([facility])    • Bipolar 1 disorder ([facility])    • COPD (chronic obstructive pulmonary disease) ([facility])    • Coronary artery disease    • Hyperlipidemia    • Hypertension      Past Surgical History:   Procedure Laterality Date   • CARDIAC SURGERY     • CORONARY ANGIOPLASTY WITH STENT PLACEMENT      2 stents     History reviewed. No pertinent family history.    Home Medications:  Prior to Admission medications    Medication Sig Start Date End Date Taking? Authorizing Provider   carvedilol (COREG) 12.5 MG tablet Take 12.5 mg by mouth 2 (Two) Times a Day With Meals.   Yes Provider, MD Astrid   amLODIPine-benazepril (LOTREL) 10-20 MG per capsule Take 1 capsule by mouth Daily.    Provider,  MD Astrid   apixaban (ELIQUIS) 5 MG tablet tablet Take 5 mg by mouth 2 (Two) Times a Day.    Astrid Rincon MD   aspirin 81 MG EC tablet Take 81 mg by mouth Daily.    Astrid Rincon MD   clopidogrel (PLAVIX) 75 MG tablet Take 75 mg by mouth Daily.    Astrid Rincon MD   folic acid (FOLVITE) 1 MG tablet Take 1 mg by mouth Daily.    Astrid Rincon MD   lamoTRIgine (LaMICtal) 100 MG tablet Take 150 mg by mouth Every Night.    Astrid Rincon MD   lithium (ESKALITH) 450 MG CR tablet Take 450 mg by mouth 2 (Two) Times a Day.    Astrid Rincon MD   nitroglycerin (NITROSTAT) 0.4 MG SL tablet Place 0.4 mg under the tongue Every 5 (Five) Minutes As Needed for Chest Pain. Take no more than 3 doses in 15 minutes.    Astrid Rincon MD   Omega-3 Fatty Acids (fish oil) 1000 MG capsule capsule Take 500 mg by mouth 2 (Two) Times a Day With Meals.    Astrid Rincon MD   omeprazole (priLOSEC) 20 MG capsule Take 40 mg by mouth Daily. Before meals    Astrid Rincon MD   rosuvastatin (CRESTOR) 20 MG tablet Take 40 mg by mouth Daily.    Astrid Rincon MD   terazosin (HYTRIN) 5 MG capsule Take 5 mg by mouth Every Night.    Astrid Rincon MD   zolpidem (Ambien) 10 MG tablet Take 10 mg by mouth At Night As Needed for Sleep.    Astrid Rincon MD        Social History:   Social History     Tobacco Use   • Smoking status: Former Smoker   • Smokeless tobacco: Never Used   Vaping Use   • Vaping Use: Never used   Substance Use Topics   • Alcohol use: Never   • Drug use: Yes     Types: Marijuana         Review of Systems:  Review of Systems   Constitutional: Positive for diaphoresis. Negative for chills and fever.   HENT: Positive for trouble swallowing. Negative for congestion, postnasal drip, rhinorrhea and sore throat.    Eyes: Negative for photophobia.   Respiratory: Positive for cough (dry) and shortness of breath (with exertion). Negative for chest  "tightness.    Cardiovascular: Positive for chest pain. Negative for palpitations and leg swelling.   Gastrointestinal: Positive for nausea. Negative for abdominal pain, diarrhea and vomiting.   Genitourinary: Negative for difficulty urinating, dysuria, flank pain, frequency, hematuria and urgency.   Musculoskeletal: Positive for myalgias (Positive for right arm pain. Negative for diffuse muscle aches. Negative for unilateral leg pain.). Negative for neck pain and neck stiffness.   Skin: Negative for pallor and rash.   Neurological: Positive for syncope (near). Negative for dizziness, weakness, numbness and headaches.   Hematological: Negative for adenopathy. Does not bruise/bleed easily.   Psychiatric/Behavioral: Negative.         Physical Exam:  /77   Pulse 89   Temp 98.3 °F (36.8 °C) (Oral)   Resp 18   Ht 190.5 cm (75\")   Wt 109 kg (239 lb 10.2 oz)   SpO2 96%   BMI 29.95 kg/m²     Physical Exam  Vitals and nursing note reviewed.   Constitutional:       General: He is not in acute distress.     Appearance: Normal appearance. He is not ill-appearing, toxic-appearing or diaphoretic.   HENT:      Head: Normocephalic and atraumatic.      Mouth/Throat:      Mouth: Mucous membranes are moist.   Eyes:      Pupils: Pupils are equal, round, and reactive to light.   Cardiovascular:      Rate and Rhythm: Normal rate. Rhythm irregular.      Pulses:           Carotid pulses are 1+ on the right side and 1+ on the left side.       Radial pulses are 1+ on the right side and 1+ on the left side.        Femoral pulses are 1+ on the right side and 1+ on the left side.       Popliteal pulses are 1+ on the right side and 1+ on the left side.        Dorsalis pedis pulses are 1+ on the right side and 1+ on the left side.        Posterior tibial pulses are 1+ on the right side and 1+ on the left side.      Heart sounds: Normal heart sounds. No murmur heard.  Pulmonary:      Effort: Pulmonary effort is normal. No tachypnea, " accessory muscle usage, respiratory distress or retractions.      Breath sounds: Decreased breath sounds (diffusely) present. No wheezing, rhonchi or rales.   Abdominal:      General: Abdomen is flat. There is no distension.      Palpations: Abdomen is soft. There is no mass.      Tenderness: There is no abdominal tenderness. There is no right CVA tenderness, left CVA tenderness, guarding or rebound.      Comments: No rigidity   Musculoskeletal:         General: No swelling, tenderness or deformity.      Cervical back: Neck supple. No tenderness.      Right lower leg: No edema.      Left lower leg: No edema.      Comments: No calf tenderness   Skin:     General: Skin is warm and dry.      Capillary Refill: Capillary refill takes less than 2 seconds.      Coloration: Skin is not jaundiced or pale.      Findings: No erythema.   Neurological:      General: No focal deficit present.      Mental Status: He is alert and oriented to person, place, and time. Mental status is at baseline.      Sensory: No sensory deficit.      Motor: No weakness.      Comments: Grossly neurologically intact    Psychiatric:         Mood and Affect: Mood normal.         Behavior: Behavior normal.                Medications in the Emergency Department:  Medications   sodium chloride 0.9 % flush 10 mL (has no administration in time range)   aspirin chewable tablet 324 mg (324 mg Oral Given 4/10/22 1955)   nitroglycerin (NITROSTAT) ointment 0.5 inch (0.5 inches Topical Given 4/10/22 2235)        Labs  Lab Results (last 24 hours)     Procedure Component Value Units Date/Time    POC Troponin I with Hold Tube [[id number]] Collected: 04/10/22 1939    Specimen: Blood Updated: 04/10/22 1951    Narrative:      The following orders were created for panel order POC Troponin I with Hold Tube.  Procedure                               Abnormality         Status                     ---------                               -----------         ------                      POC Troponin I[[id number]]                                                              HOLD Troponin-I Tube[[id number]]                             In process                   Please view results for these tests on the individual orders.    CBC & Differential [[id number]]  (Normal) Collected: 04/10/22 1939    Specimen: Blood Updated: 04/10/22 1955    Narrative:      The following orders were created for panel order CBC & Differential.  Procedure                               Abnormality         Status                     ---------                               -----------         ------                     CBC Auto Differential[[id number]]        Normal              Final result                 Please view results for these tests on the individual orders.    Comprehensive Metabolic Panel [[id number]]  (Abnormal) Collected: 04/10/22 1939    Specimen: Blood Updated: 04/10/22 2020     Glucose 109 mg/dL      BUN 16 mg/dL      Creatinine 1.28 mg/dL      Sodium 139 mmol/L      Potassium 3.9 mmol/L      Chloride 102 mmol/L      CO2 28.4 mmol/L      Calcium 10.2 mg/dL      Total Protein 7.5 g/dL      Albumin 4.80 g/dL      ALT (SGPT) 23 U/L      AST (SGOT) 17 U/L      Alkaline Phosphatase 107 U/L      Total Bilirubin 0.5 mg/dL      Globulin 2.7 gm/dL      A/G Ratio 1.8 g/dL      BUN/Creatinine Ratio 12.5     Anion Gap 8.6 mmol/L      eGFR 64.1 mL/min/1.73      Comment: National Kidney Foundation and American Society of Nephrology (ASN) Task Force recommended calculation based on the Chronic Kidney Disease Epidemiology Collaboration (CKD-EPI) equation refit without adjustment for race.       Narrative:      GFR Normal >60  Chronic Kidney Disease <60  Kidney Failure <15      Lipase [[id number]]  (Normal) Collected: 04/10/22 1939    Specimen: Blood Updated: 04/10/22 2020     Lipase 51 U/L     BNP [[id number]]  (Normal) Collected: 04/10/22 1939    Specimen: Blood Updated: 04/10/22 2016     proBNP 121.0 pg/mL     Narrative:       Among patients with dyspnea, NT-proBNP is highly sensitive for the detection of acute congestive heart failure. In addition NT-proBNP of <300 pg/ml effectively rules out acute congestive heart failure with 99% negative predictive value.    Results may be falsely decreased if patient taking Biotin.      Magnesium [[id number]]  (Normal) Collected: 04/10/22 1939    Specimen: Blood Updated: 04/10/22 2020     Magnesium 2.0 mg/dL     CK Total & CKMB [[id number]]  (Normal) Collected: 04/10/22 1939    Specimen: Blood Updated: 04/10/22 2020     CKMB 3.42 ng/mL      Creatine Kinase 88 U/L     Narrative:      CKMB results may be falsely decreased if patient taking Biotin.    CBC Auto Differential [[id number]]  (Normal) Collected: 04/10/22 1939    Specimen: Blood Updated: 04/10/22 1955     WBC 9.26 10*3/mm3      RBC 5.57 10*6/mm3      Hemoglobin 16.4 g/dL      Hematocrit 47.5 %      MCV 85.3 fL      MCH 29.4 pg      MCHC 34.5 g/dL      RDW 12.7 %      RDW-SD 39.0 fl      MPV 9.7 fL      Platelets 255 10*3/mm3      Neutrophil % 61.4 %      Lymphocyte % 26.1 %      Monocyte % 9.3 %      Eosinophil % 2.1 %      Basophil % 0.8 %      Immature Grans % 0.3 %      Neutrophils, Absolute 5.69 10*3/mm3      Lymphocytes, Absolute 2.42 10*3/mm3      Monocytes, Absolute 0.86 10*3/mm3      Eosinophils, Absolute 0.19 10*3/mm3      Basophils, Absolute 0.07 10*3/mm3      Immature Grans, Absolute 0.03 10*3/mm3      nRBC 0.0 /100 WBC     POC Troponin I [[id number]]  (Normal) Collected: 04/10/22 1948    Specimen: Blood Updated: 04/10/22 1959     Troponin I 0.00 ng/mL      Comment: Serial Number: [id number]:  [id number]       POC Troponin I [[id number]]  (Normal) Collected: 04/10/22 2207    Specimen: Blood Updated: 04/10/22 2220     Troponin I 0.00 ng/mL      Comment: Serial Number: [id number]:  [id number]              Imaging:  XR Chest 1 View    Result Date: 4/10/2022  PROCEDURE: XR CHEST 1 VW  COMPARISON: None.  INDICATIONS: CHEST PAIN.   FINDINGS: A single frontal (AP upright portable) chest radiograph reveals no cardiac enlargement. No pneumothorax is seen.  There may be mild subsegmental atelectasis in the lung bases.  Acute infiltrate is thought to be less likely.  External artifacts obscure detail.  There may be chronic calcified granulomatous disease of the chest.       No acute infiltrate is suspected.       ANTOINETTE GALVAN JR, MD       Electronically Signed and Approved By: ANTOINETTE GALVAN JR, MD on 4/10/2022 at 20:35               Procedures:  Procedures    Progress  ED Course as of 04/10/22 2336   Sun Apr 10, 2022   2150 EKG:    Rhythm: Atrial fibrillation  Rate: 86  Intervals: Normal QT interval  T-wave: Baseline artifact nonspecific T wave flattening  ST Segment: Nonspecific ST segment V2, V3, V4, V5, V6 there is no reciprocal ST depression    EKG Comparison: There is no EKG available for comparison    Interpreted by me   [SD]   2226 EKG:    Rhythm: Atrial fibrillation  Rate: 63  Intervals: Normal QT interval  T-wave: Baseline artifact with nonspecific T wave flattening  ST Segment: Nonspecific ST segments V2, V3, V4, V5, V6, no reciprocal ST depression    EKG Comparison: EKG is unchanged from the previous EKG performed in the department    Interpreted by me   [SD]      ED Course User Index  [SD] Dereck Man DO                           Medical Decision Making:  MDM  Number of Diagnoses or Management Options  Atrial fibrillation, unspecified type (HCC)  Chest pain, unspecified type  Chronic obstructive pulmonary disease, unspecified COPD type (HCC)  Unstable angina (HCC)  Diagnosis management comments: Heart score is a 5    The patient has a history of coronary disease.  The [facility]  The patient does note that he has been off his Coreg since that time.  The patient states that he would prefer to stay at this hospital to [facility].  Patient does have Medicare a and B.  She was subsequently  admitted to the hospital for further evaluation of the chest pain due to the high heart score.         Amount and/or Complexity of Data Reviewed  Clinical lab tests: reviewed  Tests in the radiology section of CPT®: reviewed  Tests in the medicine section of CPT®: reviewed  Discuss the patient with other providers: yes (The Case was discussed with the hospitalist will see and evaluate the patient emergency room.)                 Final diagnoses:   Chest pain, unspecified type   Unstable angina (HCC)   Chronic obstructive pulmonary disease, unspecified COPD type (HCC)   Atrial fibrillation, unspecified type (HCC)        Disposition:  ED Disposition     ED Disposition   Decision to Admit    Condition   --    Comment   --             Documentation assistance provided by Ant Bear acting as scribe for Dereck Man DO. Information recorded by the scribe was done at my direction and has been verified and validated by me.          Ant Bear  04/10/22 0699       Dereck Man DO  04/12/22 4465

## 2022-04-11 NOTE — DISCHARGE SUMMARY
[facility]         HOSPITALIST  DISCHARGE SUMMARY    Patient Name: Randy Chaves  : 1960  MRN: [id number]    Date of Admission: 4/10/2022  Date of Discharge:  2022  Primary Care Physician: Anna Jacobson MD    Consults     Date and Time Order Name Status Description    2022  6:39 AM Inpatient Cardiology Consult Completed     4/10/2022 11:14 PM Inpatient Hospitalist Consult          Active and Resolved Hospital Problems:  Acute chest pain  History of coronary artery disease status post 2 stents  A. fib chronic anticoagulated.  History of bipolar  History of hypertension     Hospital Course     Hospital Course:  Randy Chaves is a 61 y.o. male with CAD, previous PCI, atrial fibrillation, chronic anticoagulation, hypertension, hyperlipidemia, COPD, bipolar disorder.  Patient presented to the hospital with palpitations associated with chest discomfort and dizziness.  Patient has been on a beta-blocker however [facility] since he has not rescheduled a missed appointment with his PCP.  Currently patient is scheduled in May, unable to fill the medication currently.  Patient was admitted under observation status and monitored overnight.  After resuming his home medications patient with no further complaints or complications.  Patient was seen inpatient by cardiology, cleared from a cardiac standpoint.  Patient was refilled on his carvedilol, prescription sent to his local pharmacy.  Patient denied needing refills for any of his other medications.  Patient seen on date of discharge, clinically and hemodynamically stable.  Patient provided concerning signs and symptoms prompting immediate medical attention, patient understanding and agreeable    DISCHARGE Follow Up Recommendations for labs and diagnostics:   Follow-up with PCP soon as possible for for refill of other medications      Day of Discharge     Vital Signs:  Temp:  [97.2 °F (36.2 °C)-98.3 °F (36.8  °C)] 97.3 °F (36.3 °C)  Heart Rate:  [] 83  Resp:  [18] 18  BP: (111-138)/(48-91) 132/62  Physical Exam:   Physical Exam  Constitutional:       Appearance: Normal appearance.   HENT:      Head: Normocephalic and atraumatic.      Mouth/Throat:      Mouth: Mucous membranes are moist.      Pharynx: Oropharynx is clear.   Eyes:      Extraocular Movements: Extraocular movements intact.      Pupils: Pupils are equal, round, and reactive to light.   Cardiovascular:      Rate and Rhythm: Normal rate and regular rhythm.      Pulses: Normal pulses.      Heart sounds: Normal heart sounds.   Pulmonary:      Effort: Pulmonary effort is normal.      Breath sounds: Normal breath sounds. No wheezing.   Abdominal:      General: Abdomen is flat. Bowel sounds are normal.      Palpations: Abdomen is soft.   Musculoskeletal:         General: Normal range of motion.   Skin:     General: Skin is warm and dry.   Neurological:      General: No focal deficit present.      Mental Status: He is alert and oriented to person, place, and time.      Cranial Nerves: No cranial nerve deficit.           Discharge Details        Discharge Medications      Continue These Medications      Instructions Start Date   amLODIPine-benazepril 10-20 MG per capsule  Commonly known as: LOTREL   1 capsule, Oral, Daily      apixaban 5 MG tablet tablet  Commonly known as: ELIQUIS   5 mg, Oral, 2 Times Daily      carvedilol 12.5 MG tablet  Commonly known as: COREG   12.5 mg, Oral, 2 Times Daily With Meals      clopidogrel 75 MG tablet  Commonly known as: PLAVIX   75 mg, Oral, Daily      fish oil 1000 MG capsule capsule   500 mg, Oral, 2 Times Daily With Meals      folic acid 1 MG tablet  Commonly known as: FOLVITE   1 mg, Oral, Daily      lamoTRIgine 100 MG tablet  Commonly known as: LaMICtal   150 mg, Oral, Nightly      lithium 450 MG CR tablet  Commonly known as: ESKALITH   450 mg, Oral, 2 Times Daily      nitroglycerin 0.4 MG SL tablet  Commonly known as:  NITROSTAT   0.4 mg, Sublingual, Every 5 Minutes PRN, Take no more than 3 doses in 15 minutes.      omeprazole 20 MG capsule  Commonly known as: priLOSEC   40 mg, Oral, Daily, Before meals      rosuvastatin 20 MG tablet  Commonly known as: CRESTOR   40 mg, Oral, Daily      terazosin 5 MG capsule  Commonly known as: HYTRIN   5 mg, Oral, Nightly      zolpidem 10 MG tablet  Commonly known as: AMBIEN   10 mg, Oral, Nightly PRN             Allergies   Allergen Reactions   • Depakote [Valproic Acid] Swelling   • Simvastatin Unknown - Low Severity       Discharge Disposition:  Home or Self Care    Diet:  Hospital:  Diet Order   Procedures   • Diet Regular; Cardiac, Consistent Carbohydrate, Renal       Discharge Activity:   Activity Instructions     Activity as Tolerated            CODE STATUS:  Code Status and Medical Interventions:   Ordered at: 04/11/22 0153     Code Status (Patient has no pulse and is not breathing):    CPR (Attempt to Resuscitate)     Medical Interventions (Patient has pulse or is breathing):    Full Support         No future appointments.    Additional Instructions for the Follow-ups that You Need to Schedule     Discharge Follow-up with PCP   As directed       Currently Documented PCP:    Anna Jacobson MD    PCP Phone Number:    [phone]     Follow Up Details: In less than one week               Pertinent  and/or Most Recent Results     PROCEDURES:       LAB RESULTS:      Lab 04/11/22  0420 04/10/22  1939   WBC 6.74 9.26   HEMOGLOBIN 15.5 16.4   HEMATOCRIT 44.2 47.5   PLATELETS 215 255   NEUTROS ABS 3.97 5.69   IMMATURE GRANS (ABS) 0.02 0.03   LYMPHS ABS 1.96 2.42   MONOS ABS 0.57 0.86   EOS ABS 0.18 0.19   MCV 83.9 85.3   PROTIME 11.3  --          Lab 04/11/22  0421 04/11/22  0420 04/10/22  1939   SODIUM  --  139 139   POTASSIUM  --  3.3* 3.9   CHLORIDE  --  103 102   CO2  --  23.7 28.4   ANION GAP  --  12.3 8.6   BUN  --  15 16   CREATININE  --  1.08 1.28*   EGFR  --  78.6 64.1   GLUCOSE  --   162* 109*   CALCIUM  --  9.8 10.2   MAGNESIUM  --  1.9 2.0   PHOSPHORUS  --  2.9  --    HEMOGLOBIN A1C 5.70*  --   --          Lab 04/11/22  0420 04/10/22  1939   TOTAL PROTEIN 6.6 7.5   ALBUMIN 4.30 4.80   GLOBULIN 2.3 2.7   ALT (SGPT) 20 23   AST (SGOT) 14 17   BILIRUBIN 0.6 0.5   ALK PHOS 99 107   LIPASE  --  51         Lab 04/11/22  1133 04/11/22  0807 04/11/22  0420 04/10/22  1939   PROBNP  --   --   --  121.0   TROPONIN T <0.010 <0.010 <0.010  --    PROTIME  --   --  11.3  --    INR  --   --  1.09*  --          Lab 04/11/22 0420   CHOLESTEROL 100   LDL CHOL 44   HDL CHOL 34*   TRIGLYCERIDES 125             Brief Urine Lab Results     None        Microbiology Results (last 10 days)     ** No results found for the last 240 hours. **          XR Chest 1 View    Result Date: 4/10/2022  Impression:  No acute infiltrate is suspected.       ANTOINETTE GALVAN JR, MD       Electronically Signed and Approved By: ANTOINETTE GALVAN JR, MD on 4/10/2022 at 20:35                           Labs Pending at Discharge:  Pending Labs     Order Current Status    HOLD Troponin-I Tube Collected (04/10/22 2206)    POC Troponin I with Hold Tube Collected (04/10/22 2206)            Time spent on Discharge including face to face service:  32 minutes    Electronically signed by Chris Mendieta MD, 04/11/22, 1:48 PM EDT.

## 2022-04-11 NOTE — CASE MANAGEMENT/SOCIAL WORK
Discharge Planning Assessment   [facility]     Patient Name: Randy Chaves  MRN: [id number]  Today's Date: 4/11/2022    Admit Date: 4/10/2022     Discharge Needs Assessment     Row Name 04/11/22 1056       Living Environment    People in Home spouse    Name(s) of People in Home Teresita- wife    Current Living Arrangements home    Family Caregiver if Needed none    Quality of Family Relationships supportive;helpful;involved    Able to Return to Prior Arrangements yes       Resource/Environmental Concerns    Resource/Environmental Concerns none    Transportation Concerns none       Transition Planning    Patient/Family Anticipates Transition to home with family    Patient/Family Anticipated Services at Transition none    Transportation Anticipated family or friend will provide       Discharge Needs Assessment    Readmission Within the Last 30 Days no previous admission in last 30 days    Equipment Currently Used at Home cpap;bp cuff    Concerns to be Addressed no discharge needs identified;denies needs/concerns at this time    Anticipated Changes Related to Illness none    [facility]charge Coordination/Progress [facility]  DR [facility]               Discharge Plan     Row Name 04/11/22 1058       Plan    Plan CM spoke with patient at bedside, Patient independent at home, assists wife care for two special needs children. Patient had missed MD appointment and ran out of coreg- patient was unable to get prescription refill without seeing MD- appointment is in May. Patient will need script for coreg, MD aware. Patient denies other needs at this time.  has permission to send d/c summary to Dr Jacobson at d.c. JONI updated demographics and pharmacy has been confirmed. Patient has been vaccinated for COVID- copy made and placed in chart.              Continued Care and Services - Admitted Since 4/10/2022    Coordination has not been started for this encounter.          Demographic Summary     Row Name 04/11/22 1058        General Information    Reason for Consult discharge planning       Contact Information    Permission Granted to Share Info With family/designee    Contact Information Obtained for facility     Contact Information Comments Dr Jacobson    Row Name 04/11/22 1053       General Information    Admission Type inpatient    Arrived From emergency department    Referral Source admission list    Preferred Language English       Contact Information    Permission Granted to Share Info With family/designee               Functional Status     Row Name 04/11/22 1055       Employment/    Employment Status , active duty;disabled    Current or Previous Occupation            Current or Previous  Service active duty, past     [employer]    Row Name 04/11/22 1054       Functional Status    Usual Activity Tolerance excellent    Current Activity Tolerance excellent       Functional Status, IADL    Medications independent    Meal Preparation independent    Housekeeping independent    Laundry independent    Shopping independent       Mental Status    General Appearance WDL WDL       Mental Status Summary    Recent Changes in Mental Status/Cognitive Functioning no changes               Psychosocial    No documentation.                Abuse/Neglect    No documentation.                Legal     Row Name 04/11/22 1056       Financial/Legal    Source of Income disability       Legal    Criminal Activity/Legal Involvement none               Substance Abuse    No documentation.                Patient Forms    No documentation.                   Karen Carrillo RN

## 2022-04-11 NOTE — CONSULTS
[facility]   Cardiology Consult Note    Patient Name: Randy Chaves  : 1960  MRN: [id number]  Primary Care Physician:  Anna Jacobson MD  Referring Physician: No ref. provider found  Date of admission: 4/10/2022    Subjective   Subjective     Reason for Consult/ Chief Complaint: Atrial fibrillation    HPI:  Randy Chaves is a 61 y.o. male with CAD, status post previous PCI, atrial fibrillation, probably chronic, on anticoagulation with Eliquis, hypertension, lipidemia, COPD and bipolar disorder, presented to clinic with recurrent palpitations for the last few weeks.  They are associated with mild chest discomfort and dizziness.  He states he used to be on beta-blocker but has not been [facility] since he ran out of it.  Apart from the chest discomfort he feels during his palpitations, he has no other chest pains.  He has no dyspnea, orthopnea, edema, presyncope or syncope.  He feels good this morning without cardiac complaints.  He has no fever, chills, nausea, vomiting or diaphoresis.  He has been compliant with his medications including Plavix and Eliquis      Review of Systems   All systems were reviewed and negative except as above    Personal History     Past Medical History:   Diagnosis Date   • A-fib (HCC)    • Bipolar 1 disorder (HCC)    • COPD (chronic obstructive pulmonary disease) (HCC)    • Coronary artery disease    • Hyperlipidemia    • Hypertension           Family History: family history is not on file. Otherwise pertinent FHx was reviewed and not pertinent to current issue.    Social History:  reports that he has quit smoking. He has never used smokeless tobacco. He reports current drug use. Drug: Marijuana. He reports that he does not drink alcohol.    Home Medications:  amLODIPine-benazepril, apixaban, carvedilol, clopidogrel, fish oil, folic acid, lamoTRIgine, lithium, nitroglycerin, omeprazole, rosuvastatin, terazosin, and zolpidem    Allergies:  Allergies    Allergen Reactions   • Depakote [Valproic Acid] Swelling   • Simvastatin Unknown - Low Severity       Objective    Objective     Vitals:   Temp:  [97.2 °F (36.2 °C)-98.3 °F (36.8 °C)] 97.2 °F (36.2 °C)  Heart Rate:  [] 95  Resp:  [18] 18  BP: (111-138)/(48-91) 111/48      Physical Exam:   Constitutional: Awake, alert, No acute distress    Eyes: PERRLA, sclerae anicteric, no conjunctival injection   HENT: NCAT, mucous membranes moist   Neck: Supple, no thyromegaly, no lymphadenopathy, trachea midline   Respiratory: Clear to auscultation bilaterally, nonlabored respirations    Cardiovascular: RRR, no murmurs, rubs, or gallops, palpable pedal pulses bilaterally   Gastrointestinal: Positive bowel sounds, soft, nontender, nondistended   Musculoskeletal: No bilateral ankle edema, no clubbing or cyanosis to extremities   Psychiatric: Appropriate affect, cooperative   Neurologic: Oriented x 3, strength symmetric in all extremities, Cranial Nerves grossly intact to confrontation, speech clear   Skin: No rashes     Result Review    Result Review:  I have personally reviewed the results from the time of this admission to 4/11/2022 09:49 EDT and agree with these findings:  [x]  Laboratory  []  Microbiology  [x]  Radiology  [x]  EKG/Telemetry   [x]  Cardiology/Vascular   []  Pathology  [x]  Old records  []  Other:  Most notable findings include:     CMP    CMP 4/10/22 4/11/22   Glucose 109 (A) 162 (A)   BUN 16 15   Creatinine 1.28 (A) 1.08   Sodium 139 139   Potassium 3.9 3.3 (A)   Chloride 102 103   Calcium 10.2 9.8   Albumin 4.80 4.30   Total Bilirubin 0.5 0.6   Alkaline Phosphatase 107 99   AST (SGOT) 17 14   ALT (SGPT) 23 20   (A) Abnormal value             CBC    CBC 4/10/22 4/11/22   WBC 9.26 6.74   RBC 5.57 5.27   Hemoglobin 16.4 15.5   Hematocrit 47.5 44.2   MCV 85.3 83.9   MCH 29.4 29.4   MCHC 34.5 35.1   RDW 12.7 12.5   Platelets 255 215            Lab Results   Component Value Date    TROPONINT <0.010  04/11/2022         Assessment/Plan   Assessment / Plan     Assessment:  -Atypical chest pain, resolved.  He ruled out for ACS.  -CAD: Status post [facility], most recently few months ago.    -Atrial fibrillation, presumably chronic.  He has been having palpitations which seem to have been triggered by running out of his carvedilol.  He is on Eliquis.  -Hypertension: Blood pressure has been stable  -Mixed dyslipidemia: On statin therapy  -COPD: Clinically stable    Plan:   -Patient ruled out for acute coronary syndrome. Most of his complaints relate to palpitations which seem to have been triggered by running out of his carvedilol which he could not refill [facility] the [facility].  This medication was resumed and will be filled at discharge.  -Continue Plavix, Eliquis and rosuvastatin.    Thank you for the consultation.  He may be discharged from cardiac perspective.  He was advised to follow-up with [facility] as soon as possible after discharge.      Electronically signed by Marlene Kwong MD, 04/11/22, 9:49 AM EDT.

## 2022-04-11 NOTE — H&P
[facility] HISTORY AND PHYSICAL  Date: 2022   Patient Name: Randy Chaves  : 1960  MRN: [id number]  Primary Care Physician:  Anna Jacobson MD  Date of admission: 4/10/2022    Subjective   Subjective     Chief Complaint: Chests pain and irregular heart       HPI: Randy Chaves is a 61 y.o. male [place] to the hospital with 1 week history of chest pain.  Patient states that pain is associated with exertion.  Alleviated by rest.  Is associated with orthopnea.  Patient has a history of coronary artery disease he normally gets his care at the [facility] however and wishes to state outside of the [facility].  He states he has had a stent placed 3 to 4 months ago and a stent prior to that a few years ago however he states it has been the same blood vessel.  Right coronary artery.  Patient states he has had a recent cardiac cath prior to the stent placed a few months ago.  He is supposed to be taking anticoagulation he is compliant with his anticoagulation however he is also supposed to be on a beta-blocker that he has not taken since February because he states that the [facility] would not [facility] without [facility] him [facility].  Patient states he is also felt palpitations and yesterday started feeling right-sided upper extremity pain.  Patient denies nausea or vomiting abdominal pain fever or chills.        Personal History   ROS     Constitutional: No fever, unintentional weight loss, malaise  HEENT: No vision changes, loss of vision, double vision, difficulty swallowing, painful swallowing, or tinnitus  Respiratory: No cough, shortness of breath, sputum production, or hemoptysis  Cardiovascular chest pain.  Gastrointestinal: No nausea, vomiting, diarrhea, hematochezia, bright red blood per rectum, dark tarry stools, bowel incontinence or constipation  Genitourinary: No dysuria, hematuria, bladder incontinence, frequency, nocturia, or hesitancy  Musculoskeletal: No arthritis, joint swelling,  deformities or joint pain  Endocrine: No fatigue, heat or cold intolerance  Hematologic: No excessive bruising or bleeding  Psychiatric: No Anxiety or depression  Neurologic: No Confusion, numbness, or weakness  Skin: No rash or open wounds         PMH  Past Medical History:   Diagnosis Date   • A-fib (HCC)    • Bipolar 1 disorder (HCC)    • COPD (chronic obstructive pulmonary disease) (HCC)    • Coronary artery disease    • Hyperlipidemia    • Hypertension          PSH  Past Surgical History:   Procedure Laterality Date   • CARDIAC SURGERY     • CORONARY ANGIOPLASTY WITH STENT PLACEMENT      2 stents       FH  History reviewed. No pertinent family history.    SOCIAL HISTORY   reports that he has quit smoking. He has never used smokeless tobacco. He reports current drug use. Drug: Marijuana. He reports that he does not drink alcohol.     ALLERGIES   Allergies   Allergen Reactions   • Depakote [Valproic Acid] Swelling   • Simvastatin Unknown - Low Severity       HOME MEDICINES  Prior to Admission Medications   Prescriptions Last Dose Informant Patient Reported? Taking?   Omega-3 Fatty Acids (fish oil) 1000 MG capsule capsule 4/10/2022 at Unknown time  Yes Yes   Sig: Take 500 mg by mouth 2 (Two) Times a Day With Meals.   amLODIPine-benazepril (LOTREL) 10-20 MG per capsule 4/10/2022 at Unknown time  Yes Yes   Sig: Take 1 capsule by mouth Daily.   apixaban (ELIQUIS) 5 MG tablet tablet 4/10/2022 at Unknown time  Yes Yes   Sig: Take 5 mg by mouth 2 (Two) Times a Day.   carvedilol (COREG) 12.5 MG tablet 4/10/2022 at Unknown time  Yes Yes   Sig: Take 12.5 mg by mouth 2 (Two) Times a Day With Meals.   clopidogrel (PLAVIX) 75 MG tablet 4/9/2022 at Unknown time  Yes Yes   Sig: Take 75 mg by mouth Daily.   folic acid (FOLVITE) 1 MG tablet 4/10/2022 at Unknown time  Yes Yes   Sig: Take 1 mg by mouth Daily.   lamoTRIgine (LaMICtal) 100 MG tablet 4/9/2022 at Unknown time  Yes Yes   Sig: Take 150 mg by mouth Every Night.   lithium  (ESKALITH) 450 MG CR tablet 4/10/2022 at Unknown time  Yes Yes   Sig: Take 450 mg by mouth 2 (Two) Times a Day.   nitroglycerin (NITROSTAT) 0.4 MG SL tablet Unknown at Unknown time  Yes No   Sig: Place 0.4 mg under the tongue Every 5 (Five) Minutes As Needed for Chest Pain. Take no more than 3 doses in 15 minutes.   omeprazole (priLOSEC) 20 MG capsule 4/10/2022 at Unknown time  Yes Yes   Sig: Take 40 mg by mouth Daily. Before meals   rosuvastatin (CRESTOR) 20 MG tablet 4/9/2022 at Unknown time  Yes Yes   Sig: Take 40 mg by mouth Daily.   terazosin (HYTRIN) 5 MG capsule 4/9/2022 at Unknown time  Yes Yes   Sig: Take 5 mg by mouth Every Night.   zolpidem (AMBIEN) 10 MG tablet 4/9/2022 at Unknown time  Yes Yes   Sig: Take 10 mg by mouth At Night As Needed for Sleep.      Facility-Administered Medications: None                     Objective     Vitals:   Temp:  [98.3 °F (36.8 °C)] 98.3 °F (36.8 °C)  Heart Rate:  [] 89  Resp:  [18] 18  BP: (123-138)/(68-91) 127/77    Physical Exam  Vital signs were reviewed.  General appearance - Patient appears well-developed and well-nourished.    Head - Normocephalic, atraumatic.  Pupils - Equal, round, reactive to light.  Extraocular muscles are intact.  Conjunctiva is clear  Oral mucosa - Pink and moist without lesions or erythema.  Uvula is midline.  Neck - Supple.  Trachea was midline.  There is no palpable lymphadenopathy or thyromegaly.  There are no meningeal signs  Lungs -Breath sounds equal bilateral.  No crackles no rales.  Heart -Regular rate and rhythm no murmurs no gallops.  Abdomen -     There is no rebound, guarding, or rigidity.  There are no palpable masses.  There are no pulsatile masses.  Back - Spine is straight and midline.  There is no CVA tenderness.  Extremities - Intact x4 with full range of motion.  There is no palpable edema.  Neurologic - Cranial nerves II through XII are grossly intact.    Integument - There are no rashes.  There are no petechia or  purpura lesions noted.  There are no vesicular lesions noted.           Assessment / Plan     Assessment/Plan:  Acute chest pain  History of coronary artery disease status post 2 stents  A. fib chronic anticoagulated.  History of bipolar  History of hypertension        PLAN   Admit patient to a monitored bed.  Trend troponin  Serial EKGs  Cardiology consult in a.m.  Start home meds including amlodipine, Eliquis, Coreg, Plavix Lamictal and lithium restart statins as needed Ambien as needed nitroglycerin.  Restart statins.  SCDs bilateral for DVT prophylaxis.      DVT prophylaxis:  Medical and mechanical DVT prophylaxis orders are present.    CODE STATUS:    Code Status (Patient has no pulse and is not breathing): CPR (Attempt to Resuscitate)  Medical Interventions (Patient has pulse or is breathing): Full Support    Result Review:      [x]  Laboratory  [x]  Microbiology  [x]  Radiology  [x]  EKG/Telemetry   []  Cardiology/Vascular   []  Pathology  []  Old records  []  Other:    Admission Status:  I believe this patient meets inpatient status.    Electronically signed by Anuradha Perea MD, 04/11/22, 1:53 AM EDT.

## 2022-04-11 NOTE — PLAN OF CARE
Goal Outcome Evaluation:  Plan of Care Reviewed With: patient        Progress: no change   Patient is new admission to floor with Afib RVR. Pulse ranged 100 to 111 on arrival, now between 76 and 89. Explained carb-consist and cardiac diet; 100% snack accepted, turkey sandwich and baked lays chips with ice water. Will continue to monitor. Patient concerned with discharging early tomorrow, due to need for notification of wife, Teresita, to assist in transport.

## 2022-04-12 ENCOUNTER — READMISSION MANAGEMENT (OUTPATIENT)
Dept: CALL CENTER | Facility: HOSPITAL | Age: 62
End: 2022-04-12

## 2022-04-12 NOTE — OUTREACH NOTE
Prep Survey    Flowsheet Row Responses   [facility] [place] facility patient discharged from? [facility]   Is LACE score < 7 ? Yes   Emergency Room discharge w/ pulse ox? No   Eligibility Not Eligible   What are the reasons patient is not eligible? Other  [Low risk for readmission]   Does the patient have one of the following disease processes/diagnoses(primary or secondary)? Other   Prep survey completed? Yes          MAITE NORTON - Registered Nurse

## 2023-05-09 ENCOUNTER — TRANSCRIBE ORDERS (OUTPATIENT)
Dept: LAB | Facility: HOSPITAL | Age: 63
End: 2023-05-09
Payer: OTHER GOVERNMENT

## 2023-05-09 ENCOUNTER — LAB (OUTPATIENT)
Dept: LAB | Facility: HOSPITAL | Age: 63
End: 2023-05-09
Payer: MEDICARE

## 2023-05-09 ENCOUNTER — APPOINTMENT (OUTPATIENT)
Dept: LAB | Facility: HOSPITAL | Age: 63
End: 2023-05-09
Payer: MEDICARE

## 2023-05-09 DIAGNOSIS — L40.0 PSORIASIS VULGARIS: Primary | ICD-10-CM

## 2023-05-09 DIAGNOSIS — L40.0 PSORIASIS VULGARIS: ICD-10-CM

## 2023-05-09 LAB
ALBUMIN SERPL-MCNC: 4.8 G/DL (ref 3.5–5.2)
ALBUMIN/GLOB SERPL: 1.7 G/DL
ALP SERPL-CCNC: 88 U/L (ref 39–117)
ALT SERPL W P-5'-P-CCNC: 13 U/L (ref 1–41)
ANION GAP SERPL CALCULATED.3IONS-SCNC: 11 MMOL/L (ref 5–15)
AST SERPL-CCNC: 14 U/L (ref 1–40)
BASOPHILS # BLD AUTO: 0.06 10*3/MM3 (ref 0–0.2)
BASOPHILS NFR BLD AUTO: 0.7 % (ref 0–1.5)
BILIRUB SERPL-MCNC: 0.5 MG/DL (ref 0–1.2)
BUN SERPL-MCNC: 13 MG/DL (ref 8–23)
BUN/CREAT SERPL: 12.1 (ref 7–25)
CALCIUM SPEC-SCNC: 10.1 MG/DL (ref 8.6–10.5)
CHLORIDE SERPL-SCNC: 106 MMOL/L (ref 98–107)
CO2 SERPL-SCNC: 23 MMOL/L (ref 22–29)
CREAT SERPL-MCNC: 1.07 MG/DL (ref 0.76–1.27)
DEPRECATED RDW RBC AUTO: 40.9 FL (ref 37–54)
EGFRCR SERPLBLD CKD-EPI 2021: 78.5 ML/MIN/1.73
EOSINOPHIL # BLD AUTO: 0.2 10*3/MM3 (ref 0–0.4)
EOSINOPHIL NFR BLD AUTO: 2.4 % (ref 0.3–6.2)
ERYTHROCYTE [DISTWIDTH] IN BLOOD BY AUTOMATED COUNT: 13.1 % (ref 12.3–15.4)
GLOBULIN UR ELPH-MCNC: 2.8 GM/DL
GLUCOSE SERPL-MCNC: 79 MG/DL (ref 65–99)
HBV CORE IGM SERPL QL IA: NORMAL
HBV SURFACE AB SER RIA-ACNC: NORMAL
HBV SURFACE AG SERPL QL IA: NORMAL
HCT VFR BLD AUTO: 44.8 % (ref 37.5–51)
HCV AB SER DONR QL: NORMAL
HGB BLD-MCNC: 15.5 G/DL (ref 13–17.7)
IMM GRANULOCYTES # BLD AUTO: 0.09 10*3/MM3 (ref 0–0.05)
IMM GRANULOCYTES NFR BLD AUTO: 1.1 % (ref 0–0.5)
LYMPHOCYTES # BLD AUTO: 1.59 10*3/MM3 (ref 0.7–3.1)
LYMPHOCYTES NFR BLD AUTO: 19 % (ref 19.6–45.3)
MCH RBC QN AUTO: 30 PG (ref 26.6–33)
MCHC RBC AUTO-ENTMCNC: 34.6 G/DL (ref 31.5–35.7)
MCV RBC AUTO: 86.7 FL (ref 79–97)
MONOCYTES # BLD AUTO: 0.68 10*3/MM3 (ref 0.1–0.9)
MONOCYTES NFR BLD AUTO: 8.1 % (ref 5–12)
NEUTROPHILS NFR BLD AUTO: 5.75 10*3/MM3 (ref 1.7–7)
NEUTROPHILS NFR BLD AUTO: 68.7 % (ref 42.7–76)
NRBC BLD AUTO-RTO: 0 /100 WBC (ref 0–0.2)
PLATELET # BLD AUTO: 230 10*3/MM3 (ref 140–450)
PMV BLD AUTO: 10 FL (ref 6–12)
POTASSIUM SERPL-SCNC: 4.1 MMOL/L (ref 3.5–5.2)
PROT SERPL-MCNC: 7.6 G/DL (ref 6–8.5)
RBC # BLD AUTO: 5.17 10*6/MM3 (ref 4.14–5.8)
SODIUM SERPL-SCNC: 140 MMOL/L (ref 136–145)
WBC NRBC COR # BLD: 8.37 10*3/MM3 (ref 3.4–10.8)

## 2023-05-09 PROCEDURE — 86706 HEP B SURFACE ANTIBODY: CPT

## 2023-05-09 PROCEDURE — 80053 COMPREHEN METABOLIC PANEL: CPT

## 2023-05-09 PROCEDURE — 86480 TB TEST CELL IMMUN MEASURE: CPT

## 2023-05-09 PROCEDURE — 80074 ACUTE HEPATITIS PANEL: CPT

## 2023-05-09 PROCEDURE — 85025 COMPLETE CBC W/AUTO DIFF WBC: CPT

## 2023-05-09 PROCEDURE — 36415 COLL VENOUS BLD VENIPUNCTURE: CPT

## 2023-05-11 LAB
GAMMA INTERFERON BACKGROUND BLD IA-ACNC: 0 IU/ML
M TB IFN-G BLD-IMP: NEGATIVE
M TB IFN-G CD4+ T-CELLS BLD-ACNC: 0 IU/ML
M TBIFN-G CD4+ CD8+T-CELLS BLD-ACNC: 0 IU/ML
MITOGEN IGNF BLD-ACNC: >10 IU/ML
QUANTIFERON INCUBATION: NORMAL
SERVICE CMNT-IMP: NORMAL

## 2023-08-15 ENCOUNTER — OFFICE VISIT (OUTPATIENT)
Dept: CARDIAC REHAB | Facility: HOSPITAL | Age: 63
End: 2023-08-15
Payer: MEDICARE

## 2023-08-15 VITALS
SYSTOLIC BLOOD PRESSURE: 120 MMHG | WEIGHT: 236.55 LBS | OXYGEN SATURATION: 96 % | BODY MASS INDEX: 29.41 KG/M2 | HEART RATE: 64 BPM | DIASTOLIC BLOOD PRESSURE: 72 MMHG | HEIGHT: 75 IN

## 2023-08-15 DIAGNOSIS — Z53.09 CONTRAINDICATION TO PERCUTANEOUS CORONARY INTERVENTION (PCI): Primary | ICD-10-CM

## 2023-08-15 PROCEDURE — 93798 PHYS/QHP OP CAR RHAB W/ECG: CPT

## 2023-08-15 RX ORDER — RANOLAZINE 500 MG/1
500 TABLET, EXTENDED RELEASE ORAL 2 TIMES DAILY
COMMUNITY

## 2023-08-15 NOTE — PROGRESS NOTES
Phase II and III Education    Discipline Teaching:  Cardiac Rehab Phase II [x]      Cardiac Rehab Phase III []      Pulmonary Rehab II []      Pulmonary Rehab III []      Peripheral Artery Disease []        Class Given:  Asthma Management []      Beginners Cardiac Rehab [x]      Beginners Pulmonary Rehab []      CAD I []      CAD II []      CHF []      Diabetes Mellitus []     Diet & Cholesterol [x]     Diet Consult []      Energy Conservation []     Exercise [x]     Grocery Store Tour []     Harmonica Therapy []     High Blood Pressure [x]     Living with COPD []     Medication Safety []     Peripheral Artery Disease []     S & S of Infection []      Stress []        Education Topics:  Angina []      Arrhythmias []      Asthma Management []      Beginning Cardiac Rehab []      Blood Pressure []     Blood Sugar []     Breathing Retrainment []     CHF []     Cooking []     Daily Weight []     Diabetes Mellitus []      Diet []     Energy Conservation []     Exercise []      Foot Care []      Grocery Store Tour []      Heart Disease []      Heart Function []      Incision Care []     Living with COPD []     Medication Safety []     PAD Symptoms/Treatment []     Reconditioning Exercises []     S & S Infection []     Smoking Consult []     Stress Management []     Test Results []       Teaching Aids:  Video [x]      PAD Handout []      Pulmonary Workbook []      CAD Teaching Packet [x]      Stress Teaching Packet []     Exercise Teaching Packet [x]      Diet Teaching Packet [x]      CHF Teaching Packet []      Blood Pressure Teaching Packet []      Smoking Cessation Packet []      Medication Safety Handout []      Pflex Training []      Diabetes Teaching Packet []      Harmonica Therapy Packet []        Teaching Method:  Discussion [x]      Written Information [x]      Audio Visual [x]      Demonstration []        Teaching Recipient:  Patient [x]      Family []      Friend []      Legal Guardian []      Primary Care  Giver []      Significant Other []        Barriers To Learning:  None [x]      Auditory []      Cultural []      Emotional []      Financial []      Language []    Mental []      Motivation []      Physical []      Reading Skills []      Worship []      Verbal/Cognitive []      Visual []      Written/Cognitive []        Teaching Response:  Verified Via Teach back [x]      Return Demo Via Teach Back []      Reinforcement Needed []      Unable to Return Demo []      Unable to Comprehend []      Declines Teaching  []        Additional Education Topics  Discussed  needs and counseling numbers- denies at this time  States has numbers at home from previous appointments    Smoking cessation next visit - declined today  Follow Up Instruction Comment:  Discussed NTG use and need to sit or ly down if used  Discussed and verbalized understanding of need to notify staff of any dizziness  Chest pain or shortness of breath

## 2023-08-15 NOTE — PROGRESS NOTES
Chief Complaint  Cardiac Rehab Phase II    Randy Chaves presents to [facility] CARDIOPULMONARY REHABILITATION    Physical Exam  Cardiovascular:      Rate and Rhythm: Normal rate and regular rhythm. Tachycardia present.      Heart sounds: Normal heart sounds.   Pulmonary:      Effort: Pulmonary effort is normal.      Breath sounds: Normal breath sounds.      Comments: Bilateral lungs clear   Abdominal:      General: Bowel sounds are normal.   Musculoskeletal:      Comments: No swelling noted   Neurological:      Mental Status: He is alert.   Psychiatric:      Comments: PHQ-9 at 19 and [place] [id number]  Psychiatry every 3 months and has states next appointment soon      Result Review :          Assessment and Plan    Diagnoses and all orders for this visit:    1. Contraindication to percutaneous coronary intervention (PCI) (Primary)        Liliana Johnson RN

## 2023-08-24 ENCOUNTER — APPOINTMENT (OUTPATIENT)
Dept: CARDIAC REHAB | Facility: HOSPITAL | Age: 63
End: 2023-08-24
Payer: MEDICARE

## 2023-09-07 ENCOUNTER — TELEPHONE (OUTPATIENT)
Dept: CARDIAC REHAB | Facility: HOSPITAL | Age: 63
End: 2023-09-07
Payer: OTHER GOVERNMENT

## 2023-09-07 NOTE — TELEPHONE ENCOUNTER
Called home and mobile phone to discuss cardiac rehab attendance. Unable to reach or leave message on either phone.

## 2023-09-12 ENCOUNTER — TELEPHONE (OUTPATIENT)
Dept: CARDIAC REHAB | Facility: HOSPITAL | Age: 63
End: 2023-09-12
Payer: OTHER GOVERNMENT

## 2023-09-12 NOTE — TELEPHONE ENCOUNTER
No attendance for cardiac rehab sessions since initial interview on 8/15/23. Attempted to call patient on 3 different numbers on 9/12/23 at 0820. No answer and could not leave message. Will discharge patient today due to low attendance.

## 2024-02-14 ENCOUNTER — TRANSCRIBE ORDERS (OUTPATIENT)
Dept: ADMINISTRATIVE | Facility: HOSPITAL | Age: 64
End: 2024-02-14
Payer: OTHER GOVERNMENT

## 2024-02-14 DIAGNOSIS — R10.9 RIGHT FLANK PAIN: ICD-10-CM

## 2024-02-14 DIAGNOSIS — R68.81 EARLY SATIETY: ICD-10-CM

## 2024-02-14 DIAGNOSIS — R11.0 NAUSEA: Primary | ICD-10-CM

## 2024-03-12 ENCOUNTER — TRANSCRIBE ORDERS (OUTPATIENT)
Dept: ADMINISTRATIVE | Facility: HOSPITAL | Age: 64
End: 2024-03-12
Payer: OTHER GOVERNMENT

## 2024-03-12 DIAGNOSIS — R11.0 NAUSEA: ICD-10-CM

## 2024-03-12 DIAGNOSIS — R93.5 ABNORMAL CT OF THE ABDOMEN: ICD-10-CM

## 2024-03-12 DIAGNOSIS — N28.9 RENAL LESION: ICD-10-CM

## 2024-03-12 DIAGNOSIS — K76.9 LIVER LESION: Primary | ICD-10-CM

## 2024-03-12 DIAGNOSIS — R63.4 WEIGHT LOSS: ICD-10-CM

## 2024-04-11 ENCOUNTER — HOSPITAL ENCOUNTER (OUTPATIENT)
Dept: MRI IMAGING | Facility: HOSPITAL | Age: 64
Discharge: HOME | End: 2024-04-11
Payer: MEDICARE

## 2024-04-11 DIAGNOSIS — R93.5 ABNORMAL CT OF THE ABDOMEN: ICD-10-CM

## 2024-04-11 DIAGNOSIS — N28.9 RENAL LESION: ICD-10-CM

## 2024-04-11 DIAGNOSIS — R11.0 NAUSEA: ICD-10-CM

## 2024-04-11 DIAGNOSIS — K76.9 LIVER LESION: ICD-10-CM

## 2024-04-11 DIAGNOSIS — R63.4 WEIGHT LOSS: ICD-10-CM

## 2024-04-11 LAB
CREAT BLDA-MCNC: 1.2 MG/DL (ref 0.6–1.3)
EGFRCR SERPLBLD CKD-EPI 2021: 68.4 ML/MIN/1.73

## 2024-04-11 PROCEDURE — 82565 ASSAY OF CREATININE: CPT

## 2024-04-11 PROCEDURE — A9577 INJ MULTIHANCE: HCPCS | Performed by: NURSE PRACTITIONER

## 2024-04-11 PROCEDURE — 0 GADOBENATE DIMEGLUMINE 529 MG/ML SOLUTION: Performed by: NURSE PRACTITIONER

## 2024-04-11 PROCEDURE — 74183 MRI ABD W/O CNTR FLWD CNTR: CPT

## 2024-04-11 RX ADMIN — GADOBENATE DIMEGLUMINE 20 ML: 529 INJECTION, SOLUTION INTRAVENOUS at 16:29

## 2024-11-07 ENCOUNTER — TRANSCRIBE ORDERS (OUTPATIENT)
Dept: FAMILY MEDICINE CLINIC | Facility: CLINIC | Age: 64
End: 2024-11-07
Payer: OTHER GOVERNMENT

## 2024-11-07 DIAGNOSIS — F31.9 BIPOLAR DEPRESSION: Primary | ICD-10-CM

## 2024-11-11 ENCOUNTER — OFFICE VISIT (OUTPATIENT)
Age: 64
End: 2024-11-11
Payer: MEDICARE

## 2024-11-11 VITALS
HEART RATE: 86 BPM | DIASTOLIC BLOOD PRESSURE: 78 MMHG | SYSTOLIC BLOOD PRESSURE: 116 MMHG | BODY MASS INDEX: 28.6 KG/M2 | OXYGEN SATURATION: 98 % | WEIGHT: 230 LBS | HEIGHT: 75 IN

## 2024-11-11 DIAGNOSIS — F41.1 GENERALIZED ANXIETY DISORDER: ICD-10-CM

## 2024-11-11 DIAGNOSIS — F99 INSOMNIA DUE TO OTHER MENTAL DISORDER: ICD-10-CM

## 2024-11-11 DIAGNOSIS — F31.32 BIPOLAR 1 DISORDER, DEPRESSED, MODERATE: Primary | ICD-10-CM

## 2024-11-11 DIAGNOSIS — F51.05 INSOMNIA DUE TO OTHER MENTAL DISORDER: ICD-10-CM

## 2024-11-11 PROCEDURE — 1159F MED LIST DOCD IN RCRD: CPT | Performed by: NURSE PRACTITIONER

## 2024-11-11 PROCEDURE — 1160F RVW MEDS BY RX/DR IN RCRD: CPT | Performed by: NURSE PRACTITIONER

## 2024-11-11 PROCEDURE — 90792 PSYCH DIAG EVAL W/MED SRVCS: CPT | Performed by: NURSE PRACTITIONER

## 2024-11-11 RX ORDER — RIVAROXABAN 20 MG/1
20 TABLET, FILM COATED ORAL
COMMUNITY
Start: 2024-10-03

## 2024-11-11 RX ORDER — LAMOTRIGINE 25 MG/1
TABLET ORAL
Qty: 46 TABLET | Refills: 0 | Status: SHIPPED | OUTPATIENT
Start: 2024-11-11 | End: 2024-12-11

## 2024-11-11 RX ORDER — TRAZODONE HYDROCHLORIDE 50 MG/1
50 TABLET, FILM COATED ORAL NIGHTLY
Qty: 30 TABLET | Refills: 1 | Status: SHIPPED | OUTPATIENT
Start: 2024-11-11 | End: 2025-01-10

## 2024-11-11 RX ORDER — RISANKIZUMAB-RZAA 75 MG/0.83
150 KIT SUBCUTANEOUS ONCE
COMMUNITY

## 2024-11-11 RX ORDER — ALBUTEROL SULFATE 90 UG/1
2 INHALANT RESPIRATORY (INHALATION)
COMMUNITY
Start: 2024-10-29

## 2024-11-11 RX ORDER — TAMSULOSIN HYDROCHLORIDE 0.4 MG/1
1 CAPSULE ORAL DAILY
COMMUNITY
Start: 2024-10-03

## 2024-11-11 NOTE — TREATMENT PLAN
Multi-Disciplinary Problems (from Behavioral Health Treatment Plan)      Active Problems       Problem: Mood Disorder  Start Date: 11/11/24      Problem Details: The patient self-scales this problem as a 7 with 10 being the worst.        Goal Priority Start Date Expected End Date End Date    Decrease impulsivity in action- that is, not engaging in self-destructive behavious such as overspending, promiscuity, substance abuse, or use of profane language. -- 11/11/24 05/12/25 --    Goal Details: Progress toward goal:  Not appropriate to rate progress toward goal since this is the initial treatment plan.        Goal Intervention Frequency Start Date End Date    Provide structure and focus to patients thoughts and action by establishing plans and routine. PRN 11/11/24 --    Intervention Details: Duration of treatment until remission of symptoms.        Goal Intervention Frequency Start Date End Date    Assist patient in setting reasonable goals and limits on behavior. PRN 11/11/24 --    Intervention Details: Duration of treatment until remission of symptoms.                               I have discussed and reviewed this treatment plan with the patient.

## 2024-11-11 NOTE — PROGRESS NOTES
"[facility]  Initial Evaluation    Referring Provider:   Thank you   Ny Cabrera, APRN  [address] [place] [address]  Your referral is greatly appreciated.    Per Referring Provider: bipolar    Chief Complaint: \"I need to get a new provider for [facility] medications[facility]\"    History of Present Illness: Randy Chaves is a 64 y.o. male who presents today for initial evaluation regarding bipolar. He presents unaccompanied in no acute distress and engages with me appropriately. Psychotropic regimen with which patient presents is described as lithium 1350 mg hs, lamotrigine 300 mg hs.     Patient reports he has been out of his medications for a couple months. Patient reports he was trying to see how long he could go without it. Patient reports he is getting aggravated real easy. If he does feel like doing something he doesn't have the energy to do it. Patient reports history of manic symptoms with decreased need for sleep, irritability, excess energy, distractibility, flight of ideas that lasted for weeks. Patient reports this was before he was placed on medications. Patient reports he feels he is starting to become manic with irritability. Patient does report that he is not getting as agitated as he usually gets and he is remaining calm in stressful situations. Patient reports his wife is away right now. Her sister passed away. Patient reports for the last few months since he hasn't taken his medication he hears people calling his name when he is alone. [facility] and believes he is doing pretty good since taking his medications. Offered quetiapine for sleep, but patient declined.    History is positive for signs/symptoms suggestive of bipolar 1, MDD, CORBY: history of periods with reduced need for sleep, excess energy, distractibility, irritability, impulsivity typically lasting 7+ days alternating with several-week periods of " depressive symptoms. Psychiatric screening is negative for pathognomonic history of: TBI and PTSD     Education  I have reviewed all screening tools and interpretation with the patient. I have counseled the patient with regard to diagnoses and the recommended treatment regimen as documented below: I will assume prescriptive responsibility for lamotrigine and trazodone. We will begin lamotrigine for mood instability and have reviewed the titration schedule as well as the signs and risk of SJS in addition to other more common SE including dizziness, sedation, and benign rash. I will be prescribing trazodone for insomnia/mood. Reviewed possibility for GI upset, sedation, and rarely priapism. Patient was advised of potential for development of serotonin syndrome (as well as warning signs for onset) with concomitant use of multiple serotonergic agents and to be sure their health providers are aware this medication is being taken to mitigate this risk. Patient acknowledges the diagnoses per my rendered interpretation. Patient demonstrates awareness/understanding of viable alternatives for treatment as well as potential risks, benefits, and side effects associated with this regimen and is amenable to proceed in this fashion. Patient instructed to inform office of any side effects or adverse reaction to medications.    Recommended lifestyle changes: 30 minutes of activity to increase HR 2-3 days weekly. Relaxation techniques. Set a routine, exercise 30 mintues 2 - 3 times a week, complete tasks throughout the day, healthy diet    Psychiatric History:  Diagnoses: bipolar, schizophrenia  [facility]  In[facility] history: denies  Medication trials: linaclotide, zolpidem  Other treatment modalities: therapy (not currently)  Presenting regimen: lithium, lamotrigine  Self harm: denies  Suicide attempts: x1 20+ yrs ago, took pain pills  Auditory hallucinations: hears sister talking to him, patient reports he would carry  "on conversations with her, hears people calling him from other rooms  Visual hallucinations: denies    Substance Abuse History:   Types/methods/frequency: marijuana - every day (\"keeps me calm\")  Alcohol: denies  Withdrawal history: denies  Sobriety: N/A  NA/AA: denies  Transtheoretical stage: precontemplative    Social History:  Residence: lives in house with wife, 3 children (2 live at home)  Vocation: disability  Education: HS diploma  Pertinent developmental history: denies  Trauma: denies  Pertinent legal history: DUIs in the past, younger years  Protective factors: family  Hobbies/interests: play drums, build on cars  B[employer]: believe in God  Exercise: works around the house  Dietary habits: eats once a day  Sleep issues/hygiene: difficulty maintaining sleep  Social habits: doesn't like crowds  Sunlight: no concern for under-exposure  Caffeine intake: 1 cup of coffee a day, soda mostly all day (Root beer), tea, or energy drinks  Hydration habits: no pertinent issues   history: 3 years    Family History:  Family history of psychiatric disorders: brother - schizophrenic, sister - depression, 2 sons - autistic  Suicide Attempts: brother  Suicide Completions: denies    Access to Firearms: denies    Social History     Socioeconomic History    Marital status:    Tobacco Use    Smoking status: Every Day     Current packs/day: 1.00     Average packs/day: 1 pack/day for 51.9 years (51.9 ttl pk-yrs)     Types: Cigarettes     Start date: 1973     Passive exposure: Current    Smokeless tobacco: Current   Vaping Use    Vaping status: Never Used   Substance and Sexual Activity    Alcohol use: Never    Drug use: Yes     Types: Marijuana     Comment: daily for bipolar    Sexual activity: Defer     Tobacco use counseling/intervention: current smoker; patient was counseled with regard to risks of tobacco use and encouraged to consider quitting, with or without the use of adjunctive medication, by first setting a " prospective quit date. Currently precontemplative by transtheoretical model.     PHQ-9 Depression Screening  PHQ-9 Total Score: 15    Little interest or pleasure in doing things? Several days   Feeling down, depressed, or hopeless? Several days   PHQ-2 Total Score 2   Trouble falling or staying asleep, or sleeping too much? Almost all   Feeling tired or having little energy? Over half   Poor appetite or overeating? Almost all   Feeling bad about yourself - or that you are a failure or have let yourself or your family down? Not at all   Trouble concentrating on things, such as reading the newspaper or watching television? Almost all   Moving or speaking so slowly that other people could have noticed? Or the opposite - being so fidgety or restless that you have been moving around a lot more than usual? Over half   Thoughts that you would be better off dead, or of hurting yourself in some way? Not at all   PHQ-9 Total Score 15   If you checked off any problems, how difficult have these problems made it for you to do your work, take care of things at home, or get along with other people? Somewhat difficult        CORBY-7  Feeling nervous, anxious or on edge: Not at all  Not being able to stop or control worrying: Not at all  Worrying too much about different things: Not at all  Trouble Relaxing: More than half the days  Being so restless that it is hard to sit still: More than half the days  Becoming easily annoyed or irritable: Nearly every day  CORBY 7 Total Score: 7  If you checked any problems, how difficult have these problems made it for you to do your work, take care of things at home, or get along with other people: Somewhat difficult    Problem List:  Patient Active Problem List   Diagnosis    Chest pain, unspecified type     Allergy:   Allergies   Allergen Reactions    Depakote [Valproic Acid] Anaphylaxis    Depakote [Valproic Acid] Swelling    Simvastatin Unknown - Low Severity     [facility]      Discontinued  Medications:  Medications Discontinued During This Encounter   Medication Reason    amLODIPine-benazepril (LOTREL) 10-20 MG per capsule Patient Reported Not Taking    apixaban (ELIQUIS) 5 MG tablet tablet Patient Reported Not Taking    apixaban (ELIQUIS) 5 MG tablet tablet Patient Reported Not Taking    clopidogrel (PLAVIX) 75 MG tablet Patient Reported Not Taking    folic acid (FOLVITE) 1 MG tablet Patient Reported Not Taking    lamoTRIgine (LaMICtal) 100 MG tablet Patient Reported Not Taking    linaclotide (LINZESS) 290 MCG capsule capsule Patient Reported Not Taking    lithium carbonate 300 MG capsule Patient Reported Not Taking    nicotine (NICODERM CQ) 14 MG/24HR patch Patient Reported Not Taking    nitroglycerin (NITROSTAT) 0.4 MG SL tablet Patient Reported Not Taking    Omega-3 Fatty Acids (fish oil) 1000 MG capsule capsule Patient Reported Not Taking    omeprazole (priLOSEC) 20 MG capsule Patient Reported Not Taking    omeprazole (priLOSEC) 20 MG capsule Patient Reported Not Taking    rosuvastatin (CRESTOR) 20 MG tablet Patient Reported Not Taking    zolpidem (AMBIEN) 10 MG tablet Patient Reported Not Taking    carvedilol (COREG) 12.5 MG tablet Patient Reported Not Taking       Current Medications:   Current Outpatient Medications   Medication Sig Dispense Refill    albuterol (PROVENTIL) (2.5 MG/3ML) 0.083% nebulizer solution Take 2.5 mg by nebulization Every 4 (Four) Hours As Needed for Wheezing.      albuterol sulfate  (90 Base) MCG/ACT inhaler Inhale 2 puffs 4 (Four) Times a Day.      amLODIPine (NORVASC) 5 MG tablet Take 1 tablet by mouth Daily.      carvedilol (COREG) 12.5 MG tablet Take 1 tablet by mouth 2 (Two) Times a Day With Meals.      clopidogrel (PLAVIX) 75 MG tablet Take 1 tablet by mouth Daily.      lamoTRIgine (LaMICtal) 100 MG tablet Take 1.5 tablets by mouth Daily.      lithium (ESKALITH) 450 MG CR tablet Take 1 tablet by mouth 2 (Two) Times a Day. (Patient taking differently: Take  1,250 mg by mouth 2 (Two) Times a Day. Patient sates he takes 3 tablets of 450mg nightly)      nitroglycerin (NITROSTAT) 0.4 MG SL tablet Place 1 tablet under the tongue Every 5 (Five) Minutes As Needed for Chest Pain. Take no more than 3 doses in 15 minutes.      Omega-3 Fatty Acids (fish oil) 1000 MG capsule capsule Take  by mouth Daily With Breakfast.      ranolazine (RANEXA) 500 MG 12 hr tablet Take 1 tablet by mouth 2 (Two) Times a Day.      Risankizumab-rzaa,150 MG Dose, (Skyrizi, 150 MG Dose,) 75 MG/0.83ML Prefilled Syringe Kit kit Inject 1.66 mL under the skin into the appropriate area as directed 1 (One) Time.      rosuvastatin (CRESTOR) 20 MG tablet Take 2 tablets by mouth Daily.      tamsulosin (FLOMAX) 0.4 MG capsule 24 hr capsule Take 1 capsule by mouth Daily.      terazosin (HYTRIN) 5 MG capsule Take 1 capsule by mouth Every Night.      Xarelto 20 MG tablet Take 1 tablet by mouth Daily With Dinner.      terazosin (HYTRIN) 5 MG capsule Take 1 capsule by mouth Every Night. (Patient not taking: Reported on 11/11/2024)       No current facility-administered medications for this visit.     Past Medical History:  Past Medical History:   Diagnosis Date    A-fib     Atrial fibrillation     Bipolar 1 disorder     COPD (chronic obstructive pulmonary disease)     Coronary artery disease     Hyperlipidemia     Hypertension      Past Surgical History:  Past Surgical History:   Procedure Laterality Date    CARDIAC ABLATION      CARDIAC CATHETERIZATION      CARDIAC SURGERY      CORONARY ANGIOPLASTY WITH STENT PLACEMENT      2 stents     Family History:   Family History   Problem Relation Age of Onset    Hyperlipidemia Mother     Hypertension Mother     Heart attack Father     Hyperlipidemia Father     Hypertension Father     Heart disease Father     Heart disease Sister     Heart attack Sister      negative for dementia, seizures, and substance dependence unless otherwise noted    Mental Status Exam:   Appearance:  "well-groomed, normal habitus, age-appropriate, and sits upright   Behavior: calm, appropriate in demeanor, and appropriate eye-contact  Mood/affect: anxious and flat  Speech:  within expected variance, appropriate rate, appropriate rhythm, and appropriate tone  Thought Process: linear and logical  Thought Content: coherent  SI/HI: denies both SI and HI, exhibits future-orientation, self-advocates appropriately, no regular self-harm, and no appreciable intent  Memory: no overt deficits  Orientation: oriented to person/place/time/situation  Concentration: appropriate during interview  Intellectual capacity: presumptively average  Insight: fair by given history/exam  Judgment: fair  Psychomotor: no appreciable latency/retardation/agitation/tremor  Gait: WNL    Review of Systems:   Review of Systems   Cardiovascular:  Positive for palpitations. Negative for chest pain.   Gastrointestinal:  Negative for diarrhea, nausea and vomiting.   Neurological:  Negative for dizziness and headaches.   Psychiatric/Behavioral:  Positive for dysphoric mood and sleep disturbance. Negative for hallucinations, self-injury and suicidal ideas. The patient is nervous/anxious.         Vital Signs:   /78   Pulse 86   Ht 190.5 cm (75\")   Wt 104 kg (230 lb)   SpO2 98%   BMI 28.75 kg/m²      Lab Results:   No visits with results within 6 Month(s) from this visit.   Latest known visit with results is:   Hospital Outpatient Visit on 04/11/2024   Component Date Value Ref Range Status    Creatinine 04/11/2024 1.20  0.60 - 1.30 mg/dL Final    Serial Number: [id number]:  [id number]    eGFR 04/11/2024 68.4  >60.0 mL/min/1.73 Final     EKG Results:  No results in the past 120 days found.    Imaging Results:  No Images in the past 120 days found.    ASSESSMENT AND PLAN:    ICD-10-CM ICD-9-CM   1. Bipolar 1 disorder, depressed, moderate  F31.32 296.52   2. Generalized anxiety disorder  F41.1 300.02   3. Insomnia due to other mental disorder  " F51.05 300.9    F99 327.02       64 y.o. male who presents today for initial evaluation regarding bipolar disorder, CORBY. We have discussed the history and interpreted diagnoses as above as well as the treatment plan below, including potential of risk/benefits/side effects of the recommended regimen of which the patient demonstrates understanding. Patient is agreeable to call 911 or go to the nearest ER should he become concerned for his own safety and/or the safety of those around his. There are no overt indices of acute dandre/psychosis on evaluation today.     Medication regimen: start lamotrigine ramp up 25 mg x 2 weeks, then 50 mg x 2 weeks, start trazodone 25 - 50 mg hs prn; patient is advised not to misuse prescribed medications or to use any exogenous substances that aren't disclosed to this provider as they may interact with the regimen to his detriment. Patient is agreeable to plan.  Risk Assessment: protracted risk is moderate, imminent risk is low. Risk factors include: anxiety disorder, mood disorder, and recent/ongoing psychosocial stressors. Protective factors include: intact reality testing, no access to firearms, no present SI, patient's exhibited future-orientation, strong social support, and patient's cooperation with care. Do note that this is subject to change with the Faith of new stressors, treatment non-adherence, use of substances, and/or new medical ails.  Monitoring: reviewed labs/imaging as populated above, no labs ordered; PHQ-9 today is 15/27, CORBY-7 today is 7/21  Therapy: declined  Follow-up: Return in about 4 weeks (around 12/9/2024).  Treatment plan: due 02/11/25, completed 11/11/24  Communications: Last BUN/creatinine 06/13/24, last lithium level 09/26/23, last TSH 09/27/23    TREATMENT PLAN/GOALS: challenge patterns of living conducive to symptom burden, implement recommended regimen as above with augmentative, intermittent supportive psychotherapy to reduce symptom burden. Patient  acknowledged and verbally consented to begin treatment as above. The importance of adherence to the recommended treatment and interval follow-up appointments was emphasized today. Patient was today advised to limit daily caffeine intake, hydrate appropriately, eat healthy and nutritious foods, engage sleep hygiene measures, engage appropriate exposure to sunlight, engage with hobbies in balance with life necessities, and exercise appropriate to their capacity to do so.     Billing: I have seen the patient today and considered his psychiatric complaints, rendered a diagnosis, and discussed treatment with the patient as above with which he consents.    Parts of this note are electronic transcriptions/translations of spoken language to printed text using the Dragon Dictation system.    Electronically signed by TRISTIAN Rodney, 11/11/24, 14:45 EST

## 2024-11-15 DIAGNOSIS — F31.32 BIPOLAR 1 DISORDER, DEPRESSED, MODERATE: Primary | ICD-10-CM

## 2024-11-15 DIAGNOSIS — Z51.81 THERAPEUTIC DRUG MONITORING: ICD-10-CM

## 2024-11-15 RX ORDER — LITHIUM CARBONATE 300 MG/1
600 TABLET, FILM COATED, EXTENDED RELEASE ORAL
Qty: 60 TABLET | Refills: 0 | Status: SHIPPED | OUTPATIENT
Start: 2024-11-15 | End: 2024-12-15

## 2024-11-15 NOTE — PROGRESS NOTES
Discussed risk versus benefit with patient. Explained it would be better to manage symptoms with just lamotrigine, but willing to prescribe to reduce risk of dandre. Discussed his current Afib and patient wants to continue with lithium. Patient reports he has previously tolerated medication. Will gradually increase lithium with therapeutic drug monitoring.

## 2024-12-02 ENCOUNTER — OFFICE VISIT (OUTPATIENT)
Dept: ORTHOPEDIC SURGERY | Facility: CLINIC | Age: 64
End: 2024-12-02
Payer: MEDICARE

## 2024-12-02 VITALS
OXYGEN SATURATION: 94 % | DIASTOLIC BLOOD PRESSURE: 81 MMHG | HEIGHT: 75 IN | BODY MASS INDEX: 29.72 KG/M2 | HEART RATE: 84 BPM | WEIGHT: 239 LBS | SYSTOLIC BLOOD PRESSURE: 119 MMHG

## 2024-12-02 DIAGNOSIS — M25.551 RIGHT HIP PAIN: Primary | ICD-10-CM

## 2024-12-02 DIAGNOSIS — M54.16 LUMBAR RADICULOPATHY: ICD-10-CM

## 2024-12-02 PROCEDURE — 99203 OFFICE O/P NEW LOW 30 MIN: CPT | Performed by: STUDENT IN AN ORGANIZED HEALTH CARE EDUCATION/TRAINING PROGRAM

## 2024-12-02 PROCEDURE — 1159F MED LIST DOCD IN RCRD: CPT | Performed by: STUDENT IN AN ORGANIZED HEALTH CARE EDUCATION/TRAINING PROGRAM

## 2024-12-02 PROCEDURE — 1160F RVW MEDS BY RX/DR IN RCRD: CPT | Performed by: STUDENT IN AN ORGANIZED HEALTH CARE EDUCATION/TRAINING PROGRAM

## 2024-12-02 RX ORDER — METHYLPREDNISOLONE 4 MG/1
1 TABLET ORAL DAILY
Qty: 21 TABLET | Refills: 0 | Status: SHIPPED | OUTPATIENT
Start: 2024-12-02

## 2024-12-02 NOTE — PROGRESS NOTES
"Chief Complaint  Initial Evaluation and Pain of the Right Hip    Subjective          Randy Chaves presents to [facility] for   History of Present Illness    Randy presents today for evaluation of his right leg.  He describes posterior pain at the hip that radiates down the leg.  He did has a known history of hip and low back arthritis.  He did not have a specific injury that resulted in his increased pain.  He has difficulty with weightbearing.  He has difficulty when he was driving.    Allergies   Allergen Reactions    Depakote [Valproic Acid] Anaphylaxis    Depakote [Valproic Acid] Swelling    Simvastatin Unknown - Low Severity     [facility]        Social History     Socioeconomic History    Marital status:    Tobacco Use    Smoking status: Every Day     Current packs/day: 1.00     Average packs/day: 1 pack/day for 51.9 years (51.9 ttl pk-yrs)     Types: Cigarettes     Start date: 1973     Passive exposure: Current    Smokeless tobacco: Current   Vaping Use    Vaping status: Never Used   Substance and Sexual Activity    Alcohol use: Never    Drug use: Yes     Types: Marijuana     Comment: daily for bipolar    Sexual activity: Defer        I reviewed the patient's chief complaint, history of present illness, review of systems, past medical history, surgical history, family history, social history, medications, and allergy list.     REVIEW OF SYSTEMS    Constitutional: Denies fevers, chills, weight loss  Cardiovascular: Denies chest pain, shortness of breath  Skin: Denies rashes, acute skin changes  Neurologic: Denies headache, loss of consciousness  MSK: Right hip pain      Objective   Vital Signs:   /81   Pulse 84   Ht 190.5 cm (75\")   Wt 108 kg (239 lb)   SpO2 94%   BMI 29.87 kg/m²     Body mass index is 29.87 kg/m².    Physical Exam    General: Alert. No acute distress.   Right lower extremity: No point tenderness about the hip.  Hip flexion and 90 degrees, " internal rotation to 10 degrees, external rotation of 40 degrees.  No groin pain with hip motion.  Positive straight leg raise with radicular symptoms down the leg.  Intact active ankle plantarflexion and dorsiflexion 5 out of 5 strength.  Palpable pedal pulse.    Procedures    Imaging Results (Most Recent)       Procedure Component Value Units Date/Time    XR Hip With or Without Pelvis 2 - 3 View Right [[id number]] Resulted: 24     Updated: 24    Narrative:      Indications: Right hip pain    Views: AP pelvis, AP and frog lateral right hip    Findings: Moderate arthritic changes are present in the right hip.  No   fractures are seen.  Advanced degenerative changes are seen in the lumbar   spine.    Comparative Data: No comparative data                     Assessment and Plan        XR Hip With or Without Pelvis 2 - 3 View Right    Result Date: 2024  Narrative: Indications: Right hip pain Views: AP pelvis, AP and frog lateral right hip Findings: Moderate arthritic changes are present in the right hip.  No fractures are seen.  Advanced degenerative changes are seen in the lumbar spine. Comparative Data: No comparative data    XR Hip 2-3 Vws RT    Result Date: 2024  Narrative: REVIEWING YOUR TEST RESULTS IN [facility] IS NOT A SUBSTITUTE FOR DISCUSSING THOSE RESULTS WITH YOUR HEALTH CARE PROVIDER. PLEASE CONTACT YOUR PROVIDER VIA [facility] TO DISCUSS ANY QUESTIONS OR CONCERNS YOU MAY HAVE REGARDING THESE TEST RESULTS.  RADIOLOGY REPORT FACILITY:  [facility] UNIT/AGE/GENDER: E.Rad  OP      AGE:64 Y          SEX:M PATIENT NAME/:  MILES ZENG ARNOLDO    1960 UNIT NUMBER:  [id number] ACCOUNT NUMBER:  [id number] ACCESSION NUMBER:  [id number] EXAM: XR CHEST 2VW, XR HIP 2-3 VWS RT   2024 1:38 PM HISTORY: shortness of breath COMPARISON: Chest radiograph 2024 TECHNIQUE:  4 views of the chest, 3 views of the  right hip FINDINGS: Chest: The heart is normal in size. The lungs are clear. There is no pleural effusion or pneumothorax. Right hip: No fracture. Moderate right greater than left hip osteoarthritis. Osseous protrusion of the femoral head-neck junction. Soft tissues are unremarkable. IMPRESSION: No acute radiographic abnormality of the chest. Moderate right greater than left hip osteoarthritis most likely in the setting of chronic cam type femoral acetabular impingement, otherwise no acute abnormality of the right hip. Dictated by: Tyrell Hoffman M.D. Images and Report reviewed and interpreted by: Tyrell Hoffman M.D. <PS><Electronically signed by: Tyrell Hoffman M.D.> 2024 1348 D: 2024 1346 T: 2024 1346    XR Chest 2 View    Result Date: 2024  Narrative: REVIEWING YOUR TEST RESULTS IN [facility] IS NOT A SUBSTITUTE FOR DISCUSSING THOSE RESULTS WITH YOUR HEALTH CARE PROVIDER. PLEASE CONTACT YOUR PROVIDER VIA [facility] TO DISCUSS ANY QUESTIONS OR CONCERNS YOU MAY HAVE REGARDING THESE TEST RESULTS.  RADIOLOGY REPORT FACILITY:  [facility] UNIT/AGE/GENDER: E.Rad  OP      AGE:64 Y          SEX:M PATIENT NAME/:  MILES ZENG EARL    1960 UNIT NUMBER:  [id number] ACCOUNT NUMBER:  [id number] ACCESSION NUMBER:  [id number] EXAM: XR CHEST 2VW, XR HIP 2-3 VWS RT   2024 1:38 PM HISTORY: shortness of breath COMPARISON: Chest radiograph 2024 TECHNIQUE:  4 views of the chest, 3 views of the right hip FINDINGS: Chest: The heart is normal in size. The lungs are clear. There is no pleural effusion or pneumothorax. Right hip: No fracture. Moderate right greater than left hip osteoarthritis. Osseous protrusion of the femoral head-neck junction. Soft tissues are unremarkable. IMPRESSION: No acute radiographic abnormality of the chest. Moderate right greater than left hip osteoarthritis most likely in the setting of chronic cam type  femoral acetabular impingement, otherwise no acute abnormality of the right hip. Dictated by: Tyrell Hoffman M.D. Images and Report reviewed and interpreted by: Tyrell Hoffman M.D. <PS><Electronically signed by: Tyrell Hoffman M.D.> 11/12/2024 1348 D: 11/12/2024 1346 T: 11/12/2024 1346      Diagnoses and all orders for this visit:    1. Right hip pain (Primary)  -     XR Hip With or Without Pelvis 2 - 3 View Right  -     methylPREDNISolone (MEDROL) 4 MG dose pack; Take 1 tablet by mouth Daily. Use as directed by package instructions  Dispense: 21 tablet; Refill: 0    2. Lumbar radiculopathy  -     MRI Lumbar Spine Without Contrast; Future  -     methylPREDNISolone (MEDROL) 4 MG dose pack; Take 1 tablet by mouth Daily. Use as directed by package instructions  Dispense: 21 tablet; Refill: 0        We reviewed his imaging.  We discussed that his symptoms are most consistent with lumbar radiculopathy.  He will be started on a Medrol Dosepak.  An MRI of the lumbar spine was ordered.  Follow-up after MRI to discuss results and additional treatment options.        Call or return if worsening symptoms.    Scribed for Dariusz Schmitz MD by Dariusz Schmitz MD  12/02/2024   16:57 EST         Follow Up       MRI results    Patient was given instructions and counseling regarding his condition or for health maintenance advice. Please see specific information pulled into the AVS if appropriate.       I have personally performed the services described in this document as scribed by the above individual and it is both accurate and complete. Dariusz Schmitz MD 12/02/24 16:58 EST

## 2024-12-20 DIAGNOSIS — F31.32 BIPOLAR 1 DISORDER, DEPRESSED, MODERATE: ICD-10-CM

## 2024-12-20 DIAGNOSIS — Z51.81 THERAPEUTIC DRUG MONITORING: Primary | ICD-10-CM

## 2024-12-20 RX ORDER — LITHIUM CARBONATE 300 MG/1
600 TABLET, FILM COATED, EXTENDED RELEASE ORAL
Qty: 28 TABLET | Refills: 0 | Status: SHIPPED | OUTPATIENT
Start: 2024-12-20 | End: 2025-01-03

## 2024-12-20 NOTE — TELEPHONE ENCOUNTER
Please notify patient he needs to schedule an office visit and have a lithium level blood draw for further refills. I will give a 14 day courtesy fill.

## 2025-01-06 ENCOUNTER — TELEMEDICINE (OUTPATIENT)
Age: 65
End: 2025-01-06

## 2025-01-06 DIAGNOSIS — F41.1 GENERALIZED ANXIETY DISORDER: ICD-10-CM

## 2025-01-06 DIAGNOSIS — F51.05 INSOMNIA DUE TO OTHER MENTAL DISORDER: ICD-10-CM

## 2025-01-06 DIAGNOSIS — Z51.81 THERAPEUTIC DRUG MONITORING: ICD-10-CM

## 2025-01-06 DIAGNOSIS — F99 INSOMNIA DUE TO OTHER MENTAL DISORDER: ICD-10-CM

## 2025-01-06 DIAGNOSIS — F31.32 BIPOLAR 1 DISORDER, DEPRESSED, MODERATE: Primary | ICD-10-CM

## 2025-01-06 PROCEDURE — 99214 OFFICE O/P EST MOD 30 MIN: CPT | Performed by: NURSE PRACTITIONER

## 2025-01-06 RX ORDER — LAMOTRIGINE 100 MG/1
100 TABLET, EXTENDED RELEASE ORAL DAILY
Qty: 30 TABLET | Refills: 0 | Status: SHIPPED | OUTPATIENT
Start: 2025-01-31 | End: 2025-03-02

## 2025-01-06 RX ORDER — LAMOTRIGINE 50 MG/1
TABLET, EXTENDED RELEASE ORAL
Qty: 46 TABLET | Refills: 0 | Status: SHIPPED | OUTPATIENT
Start: 2025-01-06 | End: 2025-02-05

## 2025-01-06 RX ORDER — TRAZODONE HYDROCHLORIDE 50 MG/1
50 TABLET, FILM COATED ORAL NIGHTLY
Qty: 30 TABLET | Refills: 1 | Status: SHIPPED | OUTPATIENT
Start: 2025-01-06 | End: 2025-03-07

## 2025-01-06 RX ORDER — LITHIUM CARBONATE 300 MG/1
600 TABLET, FILM COATED, EXTENDED RELEASE ORAL
Qty: 28 TABLET | Refills: 0 | Status: SHIPPED | OUTPATIENT
Start: 2025-01-06 | End: 2025-01-20

## 2025-01-06 NOTE — PROGRESS NOTES
"[facility]  Follow-up Visit    Chief Complaint: \"I need to get a new provider for [facility] medications[facility]\"     History of Present Illness: Randy Chaves is a 64 y.o. male who presents today for follow-up regarding bipolar disorder, insomnia, and CORBY. Last seen: 11/11/24 at which time restarted lamotrigine ramp up, trazodone, and lithium. He presents accompanied (wife - Teresita) in no acute distress and engages with me appropriately. Services today rendered via telehealth ([employer]) for which the patient provided informed consent. Patient is aware he can refuse to be seen remotely at any time. Patient was located in a secure, remote environment (home) as was the provider (in-office). I confirmed the patient's identity prior to evaluation and reiterated my credentials; there were no technical issues during the session today. Psychotropic regimen perceived to be effective. Side-effects per given history: none.    Current treatment regimen includes:   - lamotrigine 25 mg  - lithium 600 mg hs  - trazodone 50 mg hs prn    Today the patient feels that he hasn't had any manic episodes. Patient states he has only taken lamotrigine 25 mg and hasn't begun the increase in dose. Patient reports he has just run out of lithium. Patient reports he hasn't been able to get out of bed because of pain from his right side. Reports he was supposed to have a MRI, but was canceled because of the weather. Discussed the importance of lithium level checks and informed patient of signs of lithium toxicity. Patient denies toxicity symptoms. Will start ramp up and continue lithium at current dose until lithium levels checked. Patient will come in for blood draw on day of MRI 01/22/25. Thought process and content are devoid of overt aberration suggestive of acute dandre/psychosis. Speech pattern consistent with former exam. The patient denies SI/HI/AVH.   - contextual changes: pain is hindering quality " "of life at this time, MRI rescheduled  - sleep: decreased sleep due to pain, can't get comfortable, trazodone not effective  - appetite: \"ok\", better than it was    I have counseled the patient with regard to diagnoses and the recommended treatment regimen as documented below. Patient acknowledges the diagnoses per my rendered interpretation. Patient demonstrates understanding of potential risks/benefits/side effects associated with this regimen and is amenable to proceed in this fashion.     Assignment: begin journaling instances of overwhelm, response thereto, ideal response to cultivate insight and begin breaking a pattern of stimulus/response.    Education  I have counseled the patient with regard to diagnoses and the recommended treatment regimen as documented below. Patient acknowledges the diagnoses per my rendered interpretation. Patient demonstrates awareness/understanding of viable alternatives for treatment as well as potential risks, benefits, and side effects associated with this regimen and is amenable to proceed in this fashion. Patient instructed to inform office of any side effects or adverse reaction to medications.    Psychotherapy  - Time: 5 minutes  - interventions employed: the therapeutic alliance was strengthened to encourage the patient to express their thoughts and feelings freely. Esteem building was enhanced through praise, reassurance, normalizing/challenging, and encouragement as appropriate. General coping skills were enhanced to build distress tolerance skills and emotional regulation. Allowed patient to freely discuss issues without interruption or judgement with unconditional positive regard, active listening skills, and empathy. Provided a safe, confidential environment to facilitate the development of a positive therapeutic relationship and encourage open, honest communication. Assisted patient in processing session content; acknowledged and normalized/addressed, as appropriate, " "patient’s thoughts, feelings, and concerns by utilizing a person-centered approach in efforts to build appropriate rapport and a positive therapeutic relationship.   - Diagnoses: see assessment and plan below  - Symptoms: see subjective above  - Goals: - patient: managing bipolar symptoms and stabilizing mood    - provider: challenge patterns of living contributing to symptom burden, strengthen defenses, promote problem solving skills, restore adaptive functioning, and provide symptom relief.  - Treatment plan: continue supportive psychotherapy in subsequent appointments to provide symptom relief; work with patient to identify and challenge negative beliefs, see assessment and plan below for additional details  - functional status: fair  - mental status exam: as below  - prognosis: fair  -progress: needs improvement  -short term goal: Medication adherence and improvement of symptoms per patient report.  -long term goal: Overall improvement in mood and functioning per patient self report.    Psychiatric History:  Diagnoses: bipolar, schizophrenia  [facility]  [facility]: denies  Medication trials: linaclotide, zolpidem  Other treatment modalities: therapy (not currently)  Presenting regimen: lithium, lamotrigine  Self harm: denies  Suicide attempts: x1 20+ yrs ago, took pain pills  Auditory hallucinations: hears sister talking to him, patient reports he would carry on conversations with her, hears people calling him from other rooms  Visual hallucinations: denies     Substance Abuse History:   Types/methods/frequency: marijuana - every day (\"keeps me calm\")  Alcohol: denies  Withdrawal history: denies  Sobriety: N/A  NA/AA: denies  Transtheoretical stage: precontemplative     Social History:  Residence: lives in house with wife, 3 children (2 live at home)  Vocation: disability  Education: HS diploma  Pertinent developmental history: denies  Trauma: denies  Pertinent legal history: DUIs in the " 11-Jun-2018 18:39 past, younger years  Protective factors: family  Hobbies/interests: play drums, build on cars  Denominational: believe in God  Exercise: works around the house  Dietary habits: eats once a day  Sleep issues/hygiene: difficulty maintaining sleep  Social habits: doesn't like crowds  Sunlight: no concern for under-exposure  Caffeine intake: 1 cup of coffee a day, soda mostly all day (Root beer), tea, or energy drinks  Hydration habits: no pertinent issues   history: 3 years     Family History:  Family history of psychiatric disorders: brother - schizophrenic, sister - depression, 2 sons - autistic  Suicide Attempts: brother  Suicide Completions: denies     Access to Firearms: denies    Social History     Socioeconomic History    Marital status:    Tobacco Use    Smoking status: Every Day     Current packs/day: 1.00     Average packs/day: 1 pack/day for 52.0 years (52.0 ttl pk-yrs)     Types: Cigarettes     Start date: 1973     Passive exposure: Current    Smokeless tobacco: Current   Vaping Use    Vaping status: Never Used   Substance and Sexual Activity    Alcohol use: Never    Drug use: Yes     Types: Marijuana     Comment: daily for bipolar    Sexual activity: Defer     Tobacco use counseling/intervention: current smoker; patient was counseled with regard to risks of tobacco use and encouraged to consider quitting, with or without the use of adjunctive medication, by first setting a prospective quit date. Currently precontemplative by transtheoretical model.     PHQ-9 Depression Screening  PHQ-9 Total Score:      Little interest or pleasure in doing things?     Feeling down, depressed, or hopeless?     PHQ-2 Total Score     Trouble falling or staying asleep, or sleeping too much?     Feeling tired or having little energy?     Poor appetite or overeating?     Feeling bad about yourself - or that you are a failure or have let yourself or your family down?     Trouble concentrating on things, such as reading  the newspaper or watching television?     Moving or speaking so slowly that other people could have noticed? Or the opposite - being so fidgety or restless that you have been moving around a lot more than usual?     Thoughts that you would be better off dead, or of hurting yourself in some way?     PHQ-9 Total Score     If you checked off any problems, how difficult have these problems made it for you to do your work, take care of things at home, or get along with other people?       Change in PHQ-9 since last measure: N/A (15)    CORBY-7     Change in CORBY-7 since last measure: N/A (7)    Problem List:  Patient Active Problem List   Diagnosis    Chest pain, unspecified type     Allergy:   Allergies   Allergen Reactions    Depakote [Valproic Acid] Anaphylaxis    Depakote [Valproic Acid] Swelling    Simvastatin Unknown - Low Severity     [facility]      Discontinued Medications:  Medications Discontinued During This Encounter   Medication Reason    traZODone (DESYREL) 50 MG tablet Reorder    lithium (LITHOBID) 300 MG CR tablet Reorder       Current Medications:   Current Outpatient Medications   Medication Sig Dispense Refill    lithium (LITHOBID) 300 MG CR tablet Take 2 tablets by mouth every night at bedtime for 14 days. 28 tablet 0    traZODone (DESYREL) 50 MG tablet Take 1 tablet by mouth Every Night for 60 days. 30 tablet 1    albuterol (PROVENTIL) (2.5 MG/3ML) 0.083% nebulizer solution Take 2.5 mg by nebulization Every 4 (Four) Hours As Needed for Wheezing.      albuterol sulfate  (90 Base) MCG/ACT inhaler Inhale 2 puffs 4 (Four) Times a Day.      amLODIPine (NORVASC) 5 MG tablet Take 1 tablet by mouth Daily.      carvedilol (COREG) 12.5 MG tablet Take 1 tablet by mouth 2 (Two) Times a Day With Meals.      clopidogrel (PLAVIX) 75 MG tablet Take 1 tablet by mouth Daily.      lamoTRIgine (LaMICtal) 25 MG tablet Take 1 tablet by mouth Daily for 14 days, THEN 2 tablets Daily for 16 days. 46 tablet 0     [START ON 1/31/2025] lamoTRIgine  MG tablet sustained-release 24 hour Take 1 tablet by mouth Daily for 30 days. 30 tablet 0    lamoTRIgine ER 50 MG tablet sustained-release 24 hour Take 1 tablet by mouth Daily for 14 days, THEN 2 tablets Daily for 16 days. 46 tablet 0    methylPREDNISolone (MEDROL) 4 MG dose pack Take 1 tablet by mouth Daily. Use as directed by package instructions 21 tablet 0    nitroglycerin (NITROSTAT) 0.4 MG SL tablet Place 1 tablet under the tongue Every 5 (Five) Minutes As Needed for Chest Pain. Take no more than 3 doses in 15 minutes.      Omega-3 Fatty Acids (fish oil) 1000 MG capsule capsule Take  by mouth Daily With Breakfast.      ranolazine (RANEXA) 500 MG 12 hr tablet Take 1 tablet by mouth 2 (Two) Times a Day.      Risankizumab-rzaa,150 MG Dose, (Skyrizi, 150 MG Dose,) 75 MG/0.83ML Prefilled Syringe Kit kit Inject 1.66 mL under the skin into the appropriate area as directed 1 (One) Time.      rosuvastatin (CRESTOR) 20 MG tablet Take 2 tablets by mouth Daily.      tamsulosin (FLOMAX) 0.4 MG capsule 24 hr capsule Take 1 capsule by mouth Daily.      terazosin (HYTRIN) 5 MG capsule Take 1 capsule by mouth Every Night.      terazosin (HYTRIN) 5 MG capsule Take 1 capsule by mouth Every Night.      Xarelto 20 MG tablet Take 1 tablet by mouth Daily With Dinner.       No current facility-administered medications for this visit.     Past Medical History:  Past Medical History:   Diagnosis Date    A-fib     Atrial fibrillation     Bipolar 1 disorder     COPD (chronic obstructive pulmonary disease)     Coronary artery disease     Hyperlipidemia     Hypertension      Past Surgical History:  Past Surgical History:   Procedure Laterality Date    CARDIAC ABLATION      CARDIAC CATHETERIZATION      CARDIAC SURGERY      CORONARY ANGIOPLASTY WITH STENT PLACEMENT      2 stents     Mental Status Exam:   Appearance: well-groomed, sits upright, age-appropriate, normal habitus  Behavior: calm,  cooperative, appropriate in demeanor, appropriate eye-contact  Mood/affect: euthymic / mood-congruent and appropriate in both range and amplitude  Speech: within expected variance; appropriate rate, appropriate rhythm, appropriate tone; non-pressured  Thought Process: linear, goal-directed; no FOI or EDNA; abstraction intact  Thought Content: coherent, devoid of overt delusions/perceptual disturbances  SI/HI: denies both SI and HI; exhibits future-orientation, self-advocates appropriately, no regular self-harm, no appreciable intent  Memory: no overt deficits  Orientation: oriented to person/place/time/situation  Concentration: appropriate during interview  Intellectual capacity: presumptively average  Insight: fair by given history/exam  Judgment: appropriate by given history/exam  Psychomotor: no appreciable latency/retardation/agitation/tremor; limited assessment today given remote engagement  Gait: deferred    Vital Signs:   There were no vitals taken for this visit.   Lab Results:   No visits with results within 6 Month(s) from this visit.   Latest known visit with results is:   Hospital Outpatient Visit on 04/11/2024   Component Date Value Ref Range Status    Creatinine 04/11/2024 1.20  0.60 - 1.30 mg/dL Final    Serial Number: [id number]:  [id number]    eGFR 04/11/2024 68.4  >60.0 mL/min/1.73 Final     EKG Results:  No orders to display    Imaging Results:  No Images in the past 120 days found.    ASSESSMENT AND PLAN:    ICD-10-CM ICD-9-CM   1. Bipolar 1 disorder, depressed, moderate  F31.32 296.52   2. Insomnia due to other mental disorder  F51.05 300.9    F99 327.02   3. Generalized anxiety disorder  F41.1 300.02   4. Therapeutic drug monitoring  Z51.81 V58.83       64 y.o. male who presents today for follow-up regarding bipolar disorder, insomnia, and CORBY. We have discussed the interval history and the treatment plan below, including potential R/B/SE of the recommended regimen of which the patient  demonstrates understanding. Patient is agreeable to call 911 or go to the nearest ER should he become concerned for his own safety and/or the safety of those around him. There are no overt indices of acute dandre/psychosis on exam today.     Medication regimen: increase lamotrigine ramp up to 50 mg x2 weeks, then maintenance 100 mg, continue lithium 600 mg hs, continue trazodone 50 mg hs prn; patient is advised not to misuse prescribed medications or to use them with any exogenous substances that aren't disclosed to this provider as they may interact with the regimen to the patient's detriment.   Risk Assessment: protracted risk is moderate, imminent risk is low - no interval change. Do note that this is subject to change with the Mandaeism of new stressors, treatment non-adherence, use of substances, and/or new medical ails.   Monitoring: reviewed labs/imaging as populated above; lithium level ordered  Therapy: declined  Follow-up: 8 weeks  Treatment plan: due 02/11/25, completed 11/11/24  Communications: will discuss lithium level once received    TREATMENT PLAN/GOALS: challenge patterns of living conducive to symptom burden, implement recommended regimen as above with augmentative, intermittent supportive psychotherapy to reduce symptom burden. Patient acknowledged and verbally consented to continue treatment. The importance of adherence to the recommended treatment and interval follow-up appointments was again emphasized today: patient has fair treatment adherence per given history. Patient was today reminded to limit daily caffeine intake, hydrate appropriately, eat healthy and nutritious foods, engage sleep hygiene measures, engage appropriate exposure to sunlight, engage with hobbies in balance with life necessities, and exercise appropriate to their capacity to do so.     Billing: Additionally, I provided 5 minutes of dedicated psychotherapy to the patient, distinct from E/M services, as documented above. Start  time: 1545. Stop time: 1550.     Parts of this note are electronic transcriptions/translations of spoken language to printed text using the Dragon Dictation system.    Electronically signed by TRISTIAN Rodney, 01/06/25, 1600

## 2025-01-22 ENCOUNTER — HOSPITAL ENCOUNTER (OUTPATIENT)
Dept: MRI IMAGING | Facility: HOSPITAL | Age: 65
Discharge: HOME | End: 2025-01-22
Admitting: STUDENT IN AN ORGANIZED HEALTH CARE EDUCATION/TRAINING PROGRAM
Payer: MEDICARE

## 2025-01-22 DIAGNOSIS — M54.16 LUMBAR RADICULOPATHY: ICD-10-CM

## 2025-01-22 PROCEDURE — 72148 MRI LUMBAR SPINE W/O DYE: CPT

## 2025-01-27 ENCOUNTER — CLINICAL SUPPORT (OUTPATIENT)
Dept: FAMILY MEDICINE CLINIC | Facility: CLINIC | Age: 65
End: 2025-01-27
Payer: MEDICARE

## 2025-01-27 DIAGNOSIS — Z51.81 THERAPEUTIC DRUG MONITORING: ICD-10-CM

## 2025-01-27 LAB — LITHIUM SERPL-SCNC: <0.1 MMOL/L (ref 0.6–1.2)

## 2025-01-27 PROCEDURE — 80178 ASSAY OF LITHIUM: CPT | Performed by: NURSE PRACTITIONER

## 2025-01-27 PROCEDURE — 36415 COLL VENOUS BLD VENIPUNCTURE: CPT | Performed by: FAMILY MEDICINE

## 2025-01-27 NOTE — PROGRESS NOTES
..  Venipuncture Blood Specimen Collection  Venipuncture performed in LT arm by Emma Mcgee with good hemostasis. Patient tolerated the procedure well without complications.   01/27/25   Ny Romero MA

## 2025-01-28 DIAGNOSIS — F31.32 BIPOLAR 1 DISORDER, DEPRESSED, MODERATE: Primary | ICD-10-CM

## 2025-01-28 DIAGNOSIS — Z51.81 THERAPEUTIC DRUG MONITORING: ICD-10-CM

## 2025-01-28 RX ORDER — LAMOTRIGINE 100 MG/1
100 TABLET, EXTENDED RELEASE ORAL DAILY
Qty: 30 TABLET | Refills: 1 | Status: SHIPPED | OUTPATIENT
Start: 2025-01-31 | End: 2025-04-01

## 2025-01-28 RX ORDER — LITHIUM CARBONATE 300 MG
TABLET ORAL
Qty: 90 TABLET | Refills: 1 | Status: SHIPPED | OUTPATIENT
Start: 2025-01-28

## 2025-01-28 NOTE — PROGRESS NOTES
Spoke with patient and his wife. Informed patient of lithium level. Patient reports he did not take lithium for several days before having his lithium level tested. Will increase lithium dose gradually and have patient repeat lithium level in one week. Patient and wife verbalized understanding.

## 2025-01-30 ENCOUNTER — TELEPHONE (OUTPATIENT)
Dept: ORTHOPEDIC SURGERY | Facility: CLINIC | Age: 65
End: 2025-01-30
Payer: OTHER GOVERNMENT

## 2025-01-30 NOTE — TELEPHONE ENCOUNTER
Caller: Randy Chaves    Relationship: Self    Best call back number: [phone]       What was the call regarding: PT STATES THAT HE HAD A MISSED CALL ON YESTERDAY. CHECKED THE CHART - NOTHING SHOWING    TRIED TO WT- TO ASK- NO ANSWER    PLEASE CONTACT PT AND ADVISE

## 2025-02-05 ENCOUNTER — OFFICE VISIT (OUTPATIENT)
Dept: ORTHOPEDIC SURGERY | Facility: CLINIC | Age: 65
End: 2025-02-05
Payer: MEDICARE

## 2025-02-05 VITALS
WEIGHT: 239 LBS | SYSTOLIC BLOOD PRESSURE: 163 MMHG | HEIGHT: 75 IN | OXYGEN SATURATION: 98 % | BODY MASS INDEX: 29.72 KG/M2 | DIASTOLIC BLOOD PRESSURE: 81 MMHG | HEART RATE: 91 BPM

## 2025-02-05 DIAGNOSIS — M54.16 LUMBAR RADICULOPATHY: Primary | ICD-10-CM

## 2025-02-05 RX ORDER — GABAPENTIN 100 MG/1
100 CAPSULE ORAL 3 TIMES DAILY
Qty: 30 CAPSULE | Refills: 1 | Status: CANCELLED | OUTPATIENT
Start: 2025-02-05

## 2025-02-05 RX ORDER — GABAPENTIN 100 MG/1
100 CAPSULE ORAL 3 TIMES DAILY
Qty: 30 CAPSULE | Refills: 0 | Status: SHIPPED | OUTPATIENT
Start: 2025-02-05 | End: 2025-02-10 | Stop reason: SDUPTHER

## 2025-02-05 NOTE — PROGRESS NOTES
"Chief Complaint  Pain and Follow-up of the Right Hip    Subjective          Randy Chaves presents to [facility] for a follow up for his right hip.     History of Present Illness    The patient presents here today for a follow up for his right hip. He was last seen in the office on 12/02/24 where we ordered an MRI on his lumbar spine. He presents to the office today for these results. To review, he describes posterior pain at the hip that radiates down the leg. He has a known history of hip and low back arthritis. He did not have a specific injury that resulted in his increased pain. He has difficulty with weightbearing. He has difficulty when he was driving.     Allergies   Allergen Reactions    Depakote [Valproic Acid] Anaphylaxis    Depakote [Valproic Acid] Swelling    Simvastatin Unknown - Low Severity     States per [facility]        Social History     Socioeconomic History    Marital status:    Tobacco Use    Smoking status: Every Day     Current packs/day: 1.00     Average packs/day: 1 pack/day for 52.1 years (52.1 ttl pk-yrs)     Types: Cigarettes     Start date: 1973     Passive exposure: Current    Smokeless tobacco: Current   Vaping Use    Vaping status: Never Used   Substance and Sexual Activity    Alcohol use: Never    Drug use: Yes     Types: Marijuana     Comment: daily for bipolar    Sexual activity: Defer        I reviewed the patient's chief complaint, history of present illness, review of systems, past medical history, surgical history, family history, social history, medications, and allergy list.     REVIEW OF SYSTEMS    Constitutional: Denies fevers, chills, weight loss  Cardiovascular: Denies chest pain, shortness of breath  Skin: Denies rashes, acute skin changes  Neurologic: Denies headache, loss of consciousness  MSK: right hip pain       Objective   Vital Signs:   /81   Pulse 91   Ht 190.5 cm (75\")   Wt 108 kg (239 lb)   SpO2 98%   BMI 29.87 kg/m²   "   Body mass index is 29.87 kg/m².    Physical Exam    General: Alert. No acute distress.   Right lower extremity: No point tenderness about the hip. Hip flexion and 90 degrees, internal rotation to 10 degrees, external rotation of 40 degrees. No groin pain with hip motion. Positive straight leg raise with radicular symptoms down the leg. Intact active ankle plantarflexion and dorsiflexion 5 out of 5 strength. Palpable pedal pulse.     Procedures    Imaging Results (Most Recent)       None                     Assessment and Plan        MRI Lumbar Spine Without Contrast    Result Date: 1/24/2025  Narrative: MRI LUMBAR SPINE WO CONTRAST Date of Exam: 1/22/2025 3:24 PM EST Indication: Lumbar radiculopathy.  Comparison: None available. Technique:  Routine multiplanar/multisequence sequence images of the lumbar spine were obtained without contrast administration.  Findings: Benign hemangioma of L2. Benign hemangioma of L5. Degenerative endplate changes at L5-S1. Disc base narrowing at L5-S1. Multilevel degenerative disc phenomenon. No retroperitoneal mass or adenopathy. The conus medullaris terminates at the L2 vertebral body level. No cord signal abnormalities. Vertebral body heights are maintained. Trace grade 1 anterolisthesis of L3 on L4. Degenerative marrow edema on the right at the L2 vertebral body. Patchy mild degenerative marrow edema at L5-S1. Multiple T2 hyperintensities involving both kidneys likely renal cysts. These appear homogeneous and measure up to approximately 9.9 cm on the right and 1.6 cm on the left. T12-L1: No significant spinal canal or foraminal narrowing. L1-L2: No significant spinal canal or foraminal narrowing. L2-L3: Disc bulge. Mild canal stenosis. Mild foraminal narrowing. L3-L4: Disc bulge. Mild canal stenosis. Mild left and moderate right foraminal narrowing. L4-L5: Disc bulge. Mild canal stenosis. Mild foraminal narrowing. L5-S1: Disc osteophyte complex. No canal stenosis. Moderate  foraminal narrowing.     Impression: Multilevel degenerative changes. Moderate foraminal narrowing L5-S1. Electronically Signed: Chidi Alva MD  1/24/2025 10:23 PM EST  Workstation ID: [id number]      Diagnoses and all orders for this visit:    1. Lumbar radiculopathy (Primary)        The patient presents here today for a follow up for his right hip. MRI results was discussed and reviewed with the patient today in the office.     Limited script of Gabapentin sent into the pharmacy today in the office.     Referral placed today for Dr. Licea's regarding further evaluation and management.    Call or return if worsening symptoms.    Scribed for Dariusz Schmitz MD by Mckenna Carpenter  02/05/2025   14:00 EST       Follow Up     PRN     Patient was given instructions and counseling regarding his condition or for health maintenance advice. Please see specific information pulled into the AVS if appropriate.       I have personally performed the services described in this document as scribed by the above individual and it is both accurate and complete. Dariusz Schmitz MD 02/05/25 15:16 EST

## 2025-02-06 ENCOUNTER — TELEPHONE (OUTPATIENT)
Dept: ORTHOPEDIC SURGERY | Facility: CLINIC | Age: 65
End: 2025-02-06
Payer: OTHER GOVERNMENT

## 2025-02-06 DIAGNOSIS — M54.16 LUMBAR RADICULOPATHY: ICD-10-CM

## 2025-02-06 NOTE — TELEPHONE ENCOUNTER
Provider: REUBEN    Caller: Randy Chaves    Relationship to Patient: Self    Pharmacy: [id number] - [place], [place] - [address] AT [facility] - [phone]  - [phone] FX     Phone Number: [phone]*    Reason for Call: PT STATES PHARMACY HAS NOT RECEIVED PRESCRIPTION FOR GABAPENTIN. PLEASE ADVISE.

## 2025-02-07 NOTE — TELEPHONE ENCOUNTER
Caller: Randy Chaves    Relationship to Patient: Self    Ph[id number] [place] [facility] [address] AT [facility] - [phone]  - [phone] FX       Phone Number: [phone]    Reason for Call: PT STATES THAT [facility] HAS NOT RECEIVED THE ORDER FROM DR. ALEXIS FOR HIS GABAPENTIN.    PLEASE ADVISE

## 2025-02-10 RX ORDER — GABAPENTIN 100 MG/1
100 CAPSULE ORAL 3 TIMES DAILY
Qty: 30 CAPSULE | Refills: 0 | Status: SHIPPED | OUTPATIENT
Start: 2025-02-10

## 2025-02-10 NOTE — TELEPHONE ENCOUNTER
PATIENT STATES [facility] IN [place] [facility] HAS NOT RECEIVED SCRIPT FROM DR. ALEXIS FOR GABAPENTIN.   UPON CHECKING, THE SCRIPT PRINTED INSTEAD OF WENT VIA E-PRESCRIBE. I LET PATIENT KNOW THAT I WOULD CORRECT THIS AND RE-SEND TO DR. ALEXIS FOR HIS SIGNATURE.

## 2025-02-21 ENCOUNTER — OFFICE VISIT (OUTPATIENT)
Dept: NEUROSURGERY | Facility: CLINIC | Age: 65
End: 2025-02-21
Payer: MEDICARE

## 2025-02-21 VITALS
HEART RATE: 89 BPM | DIASTOLIC BLOOD PRESSURE: 69 MMHG | WEIGHT: 230 LBS | SYSTOLIC BLOOD PRESSURE: 165 MMHG | HEIGHT: 75 IN | BODY MASS INDEX: 28.6 KG/M2

## 2025-02-21 DIAGNOSIS — R20.2 POSITIVE TINEL SIGN: ICD-10-CM

## 2025-02-21 DIAGNOSIS — R20.0 NUMBNESS AND TINGLING OF BOTH UPPER EXTREMITIES: ICD-10-CM

## 2025-02-21 DIAGNOSIS — M54.42 CHRONIC BILATERAL LOW BACK PAIN WITH BILATERAL SCIATICA: ICD-10-CM

## 2025-02-21 DIAGNOSIS — R20.2 NUMBNESS AND TINGLING OF BOTH UPPER EXTREMITIES: ICD-10-CM

## 2025-02-21 DIAGNOSIS — M51.362 DEGENERATION OF INTERVERTEBRAL DISC OF LUMBAR REGION WITH DISCOGENIC BACK PAIN AND LOWER EXTREMITY PAIN: Primary | ICD-10-CM

## 2025-02-21 DIAGNOSIS — M48.061 FORAMINAL STENOSIS OF LUMBAR REGION: ICD-10-CM

## 2025-02-21 DIAGNOSIS — G89.29 CHRONIC BILATERAL LOW BACK PAIN WITH BILATERAL SCIATICA: ICD-10-CM

## 2025-02-21 DIAGNOSIS — M54.41 CHRONIC BILATERAL LOW BACK PAIN WITH BILATERAL SCIATICA: ICD-10-CM

## 2025-02-21 DIAGNOSIS — M43.16 SPONDYLOLISTHESIS AT L3-L4 LEVEL: ICD-10-CM

## 2025-02-21 NOTE — PROGRESS NOTES
"Chief Complaint  Hip Pain (Bilateral, right; MRI LUMBAR SPINE WO CONTRAST completed 1/22/2025), Leg Pain (Bilateral, right ), Numbness (Right leg and bilateral arms & hands), and Tingling (Right leg and bilateral arms & hands)    Subjective          Randy Chaves who is a 64 y.o. year old male who presents to [facility] for Evaluation of the Spine.     The patient complains of pain located in the Lumbar Spine.  Patients states the pain has been present for 8 months.  The pain came on acutely. The pain has been gradually progressing since then. The pain scaled level is 8.  The pain does radiate. Dermatomes are located on right Lumbar at: below the knee and to all the toes. He sometimes has left leg pain to the hip. The pain is constant and waxing/waning and described as aching.  The pain is worse at no particular time of day. Patient states nothing improves the pain.  Patient states  \"everything and walking\" makes the pain worse.    Associated Symptoms Include: Numbness and Tingling in right leg, bilateral arms/hands. He reports subjective weakness in the hands. Denies loss of bowel or bladder control.  Conservative Interventions Include: Gabapentin that was somewhat effective. Oral steroid was ineffective.    Was this the result of an injury or accident?: Yes, lifting injury while cleaning out his garage in early Summer 2024.    History of Previous Spinal Surgery?: No     reports that he has been smoking cigarettes. He started smoking about 52 years ago. He has a 52.1 pack-year smoking history. He has been exposed to tobacco smoke. He has quit using smokeless tobacco.    Review of Systems   Musculoskeletal:  Positive for back pain.        Right leg pain to all toes   Neurological:  Positive for weakness and numbness.        Objective   Vital Signs:   /69 (BP Location: Left arm, Patient Position: Sitting, Cuff Size: Adult) Comment: Randy stated that he forgot to take " "his BP medication today.  Pulse 89   Ht 190.5 cm (75\")   Wt 104 kg (230 lb)   BMI 28.75 kg/m²       Physical Exam  Constitutional:       Appearance: Normal appearance.   Pulmonary:      Effort: Pulmonary effort is normal.   Musculoskeletal:         General: No tenderness.      Comments: SLR positive on the right,  Tinel's test positive at left wrist   Neurological:      General: No focal deficit present.      Mental Status: He is alert and oriented to person, place, and time.      Sensory: No sensory deficit.      Motor: No weakness.      Deep Tendon Reflexes: Reflexes normal.   Psychiatric:         Mood and Affect: Mood normal.         Behavior: Behavior normal.        Neurological Exam  Mental Status  Alert. Oriented to person, place, and time.      Result Review     I have independently interpreted the MRI of the lumbar spine without contrast from 1/22/25 which shows multilevel degenerative disc disease.  There is anterolisthesis of L3 on L4 with moderate right and mild left foraminal stenosis there is moderate bilateral foraminal narrowing at L5-S1.  There is no high-grade central canal or foraminal stenosis otherwise.     Assessment and Plan    Diagnoses and all orders for this visit:    1. Degeneration of intervertebral disc of lumbar region with discogenic back pain and lower extremity pain (Primary)    2. Spondylolisthesis at L3-L4 level    3. Foraminal stenosis of lumbar region    4. Chronic bilateral low back pain with bilateral sciatica    5. Positive Tinel sign  -     Cancel: EMG & Nerve Conduction Test; Future  -     EMG & Nerve Conduction Test; Future    6. Numbness and tingling of both upper extremities  -     Cancel: EMG & Nerve Conduction Test; Future  -     EMG & Nerve Conduction Test; Future    He has pain in the back and right leg to all the toes which has been progressive since early Summer 2024 when he had a lifting injury while cleaning out his garage.    He has some foraminal stenosis " right to a moderate degree at L3-L4, and L5-S1. There is some anterolisthesis of L3 on L4.    We discussed possible flexion and extension x-ray to assess for instability. He is deferring today.    We discussed surgery could help right leg pain in an L3 or L5 pattern, but is probably less likely to help with moderate stenosis. He is hopeful to avoid surgery.    He is deferring PT.    He will think about doing pain management and spinal injections.    He mentions numbness and tingling in the hands and arms with positive tinel's testing at the left wrist today. I will order NCV/EMG testing of the bilateral upper extremities to assess for possible median or ulnar neuropathy.    The patient was counseled on basic recommendations for the reduction and prevention of back, neck, or spine pain in association with spinal disorders, including: cessation/avoidance of nicotine use, maintenance of a healthy BMI and weight, focusing on building/maintaining core strength through core exercise, and avoidance of activities which worsen the pain. Surgery for patients with multilevel degenerative disc disease is not typically successful, with the risks outweighing the benefit. The patient will monitor for changes in symptoms and notify our clinic of these changes as needed.    Follow Up   Return in about 4 weeks (around 3/21/2025).  Patient was given instructions and counseling regarding his condition or for health maintenance advice. Please see specific information pulled into the AVS if appropriate.

## 2025-02-24 ENCOUNTER — PATIENT ROUNDING (BHMG ONLY) (OUTPATIENT)
Dept: NEUROSURGERY | Facility: CLINIC | Age: 65
End: 2025-02-24
Payer: OTHER GOVERNMENT

## 2025-03-04 ENCOUNTER — HOSPITAL ENCOUNTER (OUTPATIENT)
Facility: HOSPITAL | Age: 65
Discharge: HOME | End: 2025-03-04
Admitting: PHYSICIAN ASSISTANT
Payer: MEDICARE

## 2025-03-04 DIAGNOSIS — R20.0 NUMBNESS AND TINGLING OF BOTH UPPER EXTREMITIES: ICD-10-CM

## 2025-03-04 DIAGNOSIS — R20.2 POSITIVE TINEL SIGN: ICD-10-CM

## 2025-03-04 DIAGNOSIS — R20.2 NUMBNESS AND TINGLING OF BOTH UPPER EXTREMITIES: ICD-10-CM

## 2025-03-04 PROCEDURE — 95911 NRV CNDJ TEST 9-10 STUDIES: CPT

## 2025-03-04 PROCEDURE — 95886 MUSC TEST DONE W/N TEST COMP: CPT

## 2025-03-10 ENCOUNTER — OFFICE VISIT (OUTPATIENT)
Age: 65
End: 2025-03-10
Payer: MEDICARE

## 2025-03-10 ENCOUNTER — CLINICAL SUPPORT (OUTPATIENT)
Dept: FAMILY MEDICINE CLINIC | Facility: CLINIC | Age: 65
End: 2025-03-10
Payer: MEDICARE

## 2025-03-10 VITALS
OXYGEN SATURATION: 97 % | BODY MASS INDEX: 28.5 KG/M2 | DIASTOLIC BLOOD PRESSURE: 84 MMHG | SYSTOLIC BLOOD PRESSURE: 132 MMHG | HEART RATE: 93 BPM | WEIGHT: 228 LBS

## 2025-03-10 DIAGNOSIS — F99 INSOMNIA DUE TO OTHER MENTAL DISORDER: ICD-10-CM

## 2025-03-10 DIAGNOSIS — Z51.81 THERAPEUTIC DRUG MONITORING: ICD-10-CM

## 2025-03-10 DIAGNOSIS — F51.05 INSOMNIA DUE TO OTHER MENTAL DISORDER: ICD-10-CM

## 2025-03-10 DIAGNOSIS — F31.32 BIPOLAR 1 DISORDER, DEPRESSED, MODERATE: Primary | ICD-10-CM

## 2025-03-10 DIAGNOSIS — F41.1 GENERALIZED ANXIETY DISORDER: ICD-10-CM

## 2025-03-10 LAB — LITHIUM SERPL-SCNC: 0.6 MMOL/L (ref 0.6–1.2)

## 2025-03-10 PROCEDURE — 80178 ASSAY OF LITHIUM: CPT | Performed by: NURSE PRACTITIONER

## 2025-03-10 PROCEDURE — 99214 OFFICE O/P EST MOD 30 MIN: CPT | Performed by: NURSE PRACTITIONER

## 2025-03-10 PROCEDURE — 1160F RVW MEDS BY RX/DR IN RCRD: CPT | Performed by: NURSE PRACTITIONER

## 2025-03-10 PROCEDURE — 36415 COLL VENOUS BLD VENIPUNCTURE: CPT | Performed by: NURSE PRACTITIONER

## 2025-03-10 PROCEDURE — 1159F MED LIST DOCD IN RCRD: CPT | Performed by: NURSE PRACTITIONER

## 2025-03-10 NOTE — PROGRESS NOTES
..  Venipuncture Blood Specimen Collection  Venipuncture performed in Lt arm by Emma Mcgee with good hemostasis. Patient tolerated the procedure well without complications.   03/10/25   Ny Romero MA

## 2025-03-10 NOTE — PROGRESS NOTES
"[facility]  Follow-up Visit    Chief Complaint: \"I need to get a new provider for [facility] medications[facility]\"      History of Present Illness: Randy Chaves is a 64 y.o. male who presents today for follow-up regarding  bipolar disorder, insomnia, and CORBY. Last seen: 01/06/25 at which time no chnages. Randy Chaves presents unaccompanied in no acute distress and engages with me appropriately. Psychotropic regimen perceived to be effective. Side-effects per given history: none.    Current treatment regimen includes:   - lamotrigine 100 mg qd  - lithium 600 mg hs  - trazodone 50 mg hs prn    Today the patient feels that he is tired and in a lot of pain. Patient reports the nerves in his leg, arms, and hands are bad. His provider suggested surgery, but he doesn't want to go that route. Patient feels that his mood is fine at this time. He reports that he doesn't feel as bad as he did before and denies any manic episodes. Patient reports his last lithium dose was last night. Patient to have lithium level drawn today. Patient last lithium level was drawn when he was off of the medication for a few weeks. Suggested he talk to his provider about pain management, but states he smokes marijuana so unable to go to pain management. Thought process and content are devoid of overt aberration suggestive of acute dandre/psychosis. The patient denies SI/HI/AVH.   - contextual changes: reports pain is his major problem right now  - sleep: can't sleep because of the pain in his body  - appetite: not eating, no appetite    Education  I have counseled the patient with regard to diagnoses and the recommended treatment regimen as documented below. Patient acknowledges the diagnoses per my rendered interpretation. Patient demonstrates awareness/understanding of viable alternatives for treatment as well as potential risks, benefits, and side effects associated with this regimen and is amenable to " proceed in this fashion. Patient instructed to inform office of any side effects or adverse reaction to medications.    Assignment: Set a routine, healthy diet    Psychotherapy  - Time: 5 minutes  - interventions employed: the therapeutic alliance was strengthened to encourage the patient to express their thoughts and feelings freely. Esteem building was enhanced through praise, reassurance, normalizing/challenging, and encouragement as appropriate. General coping skills were enhanced to build distress tolerance skills and emotional regulation. Allowed patient to freely discuss issues without interruption or judgement with unconditional positive regard, active listening skills, and empathy. Provided a safe, confidential environment to facilitate the development of a positive therapeutic relationship and encourage open, honest communication. Assisted patient in processing session content; acknowledged and normalized/addressed, as appropriate, patient’s thoughts, feelings, and concerns by utilizing a person-centered approach in efforts to build appropriate rapport and a positive therapeutic relationship.   - Diagnoses: see assessment and plan below  - Symptoms: see subjective above  - Goals: - patient: improving function and quality of life     - provider: challenge patterns of living contributing to symptom burden, strengthen defenses, promote problem solving skills, restore adaptive functioning, and provide symptom relief.  - Treatment plan: continue supportive psychotherapy in subsequent appointments to provide symptom relief; work with patient to identify and challenge negative beliefs, see assessment and plan below for additional details  - functional status: fair  - mental status exam: as below  - prognosis: fair  - progress: stable  - short term goal: Medication adherence and improvement of symptoms per patient report.  - long term goal: Overall improvement in mood and functioning per patient self  "report.    Psychiatric History:  Diagnoses: bipolar, schizophrenia  [facility]  [facility]: [facility]  Medication trials: linaclotide, zolpidem  Other treatment modalities: therapy (not currently)  Presenting regimen: lithium, lamotrigine  Self harm: denies  Suicide attempts: x1 20+ yrs ago, took pain pills  Auditory hallucinations: hears sister talking to him, patient reports he would carry on conversations with her, hears people calling him from other rooms  Visual hallucinations: denies     Substance Abuse History:   Types/methods/frequency: marijuana - every day (\"keeps me calm\")  Alcohol: denies  Withdrawal history: denies  Sobriety: N/A  NA/AA: denies  Transtheoretical stage: precontemplative     Social History:  Residence: lives in house with wife, 3 children (2 live at home)  Vocation: disability  Education: HS diploma  Pertinent developmental history: denies  Trauma: denies  Pertinent legal history: DUIs in the past, younger years  Protective factors: family  Hobbies/interests: play drums, build on cars  Yazdanism: believe in God  Exercise: works around the house  Dietary habits: eats once a day  Sleep issues/hygiene: difficulty maintaining sleep  Social habits: doesn't like crowds  Sunlight: no concern for under-exposure  Caffeine intake: 1 cup of coffee a day, soda mostly all day (Root beer), tea, or energy drinks  Hydration habits: no pertinent issues   history: 3 years     Family History:  Family history of psychiatric disorders: brother - schizophrenic, sister - depression, 2 sons - autistic  Suicide Attempts: brother  Suicide Completions: denies     Access to Firearms: denies    Social History     Socioeconomic History    Marital status:    Tobacco Use    Smoking status: Every Day     Current packs/day: 1.00     Average packs/day: 1 pack/day for 52.2 years (52.2 ttl pk-yrs)     Types: Cigarettes     Start date: 1973     Passive exposure: Current    Smokeless tobacco: " Former   Vaping Use    Vaping status: Never Used   Substance and Sexual Activity    Alcohol use: Never    Drug use: Yes     Types: Marijuana     Comment: daily for bipolar    Sexual activity: Defer     Tobacco use counseling/intervention: current smoker; patient was counseled with regard to risks of tobacco use and encouraged to consider quitting, with or without the use of adjunctive medication, by first setting a prospective quit date. Currently precontemplative by transtheoretical model.     PHQ-9 Depression Screening  PHQ-9 Total Score:      Little interest or pleasure in doing things?     Feeling down, depressed, or hopeless?     PHQ-2 Total Score     Trouble falling or staying asleep, or sleeping too much?     Feeling tired or having little energy?     Poor appetite or overeating?     Feeling bad about yourself - or that you are a failure or have let yourself or your family down?     Trouble concentrating on things, such as reading the newspaper or watching television?     Moving or speaking so slowly that other people could have noticed? Or the opposite - being so fidgety or restless that you have been moving around a lot more than usual?     Thoughts that you would be better off dead, or of hurting yourself in some way?     PHQ-9 Total Score     If you checked off any problems, how difficult have these problems made it for you to do your work, take care of things at home, or get along with other people?       Change in PHQ-9 since last measure: N/A (15)    CORBY-7     Change in CORBY-7 since last measure: N/A (7)    Problem List:  Patient Active Problem List   Diagnosis    Chest pain, unspecified type     Allergy:   Allergies   Allergen Reactions    Depakote [Valproic Acid] Anaphylaxis    Depakote [Valproic Acid] Swelling    Simvastatin Unknown - Low Severity     [facility]      Discontinued Medications:  There are no discontinued medications.    Current Medications:   Current Outpatient Medications    Medication Sig Dispense Refill    albuterol sulfate  (90 Base) MCG/ACT inhaler Inhale 2 puffs 4 (Four) Times a Day.      amLODIPine (NORVASC) 5 MG tablet Take 1 tablet by mouth Daily.      carvedilol (COREG) 12.5 MG tablet Take 1 tablet by mouth 2 (Two) Times a Day With Meals.      clopidogrel (PLAVIX) 75 MG tablet Take 1 tablet by mouth Daily.      lamoTRIgine  MG tablet sustained-release 24 hour Take 1 tablet by mouth Daily for 60 days. 30 tablet 1    lithium 300 MG tablet Take one tablet by mouth in the morning and take two tablets by mouth at bedtime 90 tablet 1    nitroglycerin (NITROSTAT) 0.4 MG SL tablet Place 1 tablet under the tongue Every 5 (Five) Minutes As Needed for Chest Pain. Take no more than 3 doses in 15 minutes.      ranolazine (RANEXA) 500 MG 12 hr tablet Take 1 tablet by mouth 2 (Two) Times a Day.      Risankizumab-rzaa,150 MG Dose, (Skyrizi, 150 MG Dose,) 75 MG/0.83ML Prefilled Syringe Kit kit Inject 1.66 mL under the skin into the appropriate area as directed 1 (One) Time.      rosuvastatin (CRESTOR) 20 MG tablet Take 2 tablets by mouth Daily.      tamsulosin (FLOMAX) 0.4 MG capsule 24 hr capsule Take 1 capsule by mouth Daily.      Xarelto 20 MG tablet Take 1 tablet by mouth Daily With Dinner.      albuterol (PROVENTIL) (2.5 MG/3ML) 0.083% nebulizer solution Take 2.5 mg by nebulization Every 4 (Four) Hours As Needed for Wheezing. (Patient not taking: Reported on 2/5/2025)      gabapentin (Neurontin) 100 MG capsule Take 1 capsule by mouth 3 (Three) Times a Day. (Patient not taking: Reported on 3/10/2025) 30 capsule 0    Omega-3 Fatty Acids (fish oil) 1000 MG capsule capsule Take  by mouth Daily With Breakfast. (Patient not taking: Reported on 3/10/2025)      terazosin (HYTRIN) 5 MG capsule Take 1 capsule by mouth Every Night. (Patient not taking: Reported on 3/10/2025)      terazosin (HYTRIN) 5 MG capsule Take 1 capsule by mouth Every Night. (Patient not taking: Reported on  "3/10/2025)      traZODone (DESYREL) 50 MG tablet Take 1 tablet by mouth Every Night for 60 days. 30 tablet 1     No current facility-administered medications for this visit.     Past Medical History:  Past Medical History:   Diagnosis Date    A-fib     Atrial fibrillation     Bipolar 1 disorder     COPD (chronic obstructive pulmonary disease)     Coronary artery disease     Hyperlipidemia     Hypertension      Past Surgical History:  Past Surgical History:   Procedure Laterality Date    CARDIAC ABLATION      CARDIAC CATHETERIZATION      CARDIAC SURGERY      CORONARY ANGIOPLASTY WITH STENT PLACEMENT      2 stents     Mental Status Exam:   Appearance: well-groomed, normal habitus, age-appropriate, and sits upright   Behavior: calm, appropriate in demeanor, and appropriate eye-contact  Mood/affect: \"in pain\", full  Speech:  within expected variance, appropriate rate, appropriate rhythm, and appropriate tone  Thought Process: linear and logical  Thought Content: coherent and devoid of overt delusions/perceptual disturbances   SI/HI: denies both SI and HI, exhibits future-orientation, self-advocates appropriately, no regular self-harm, and no appreciable intent  Memory: no overt deficits  Orientation: oriented to person/place/time/situation  Concentration: appropriate during interview  Intellectual capacity: presumptively average  Insight: good by given history/exam  Judgment: good  Psychomotor: no appreciable latency/retardation/agitation/tremor  Gait: WNL and stable    Review of Systems:   Review of Systems   Cardiovascular:  Negative for chest pain and palpitations.   Gastrointestinal:  Negative for diarrhea, nausea and vomiting.   Skin:  Negative for rash.   Neurological:  Negative for dizziness and headaches.   Psychiatric/Behavioral:  Positive for dysphoric mood and sleep disturbance. Negative for confusion, hallucinations, self-injury and suicidal ideas. The patient is not nervous/anxious.      Vital Signs:   BP " 132/84   Pulse 93   Wt 103 kg (228 lb)   SpO2 97%   BMI 28.50 kg/m²    Lab Results:   Clinical Support on 03/10/2025   Component Date Value Ref Range Status    Lithium 03/10/2025 0.6  0.6 - 1.2 mmol/L Final   Clinical Support on 01/27/2025   Component Date Value Ref Range Status    Lithium 01/27/2025 <0.1 (L)  0.6 - 1.2 mmol/L Final     EKG Results:  No orders to display    Imaging Results:  MRI Lumbar Spine Without Contrast  Result Date: 1/24/2025  Multilevel degenerative changes. Moderate foraminal narrowing L5-S1. Electronically Signed: Chidi Alva MD  1/24/2025 10:23 PM EST  Workstation ID: [id number]    ASSESSMENT AND PLAN:    ICD-10-CM ICD-9-CM   1. Bipolar 1 disorder, depressed, moderate  F31.32 296.52   2. Generalized anxiety disorder  F41.1 300.02   3. Insomnia due to other mental disorder  F51.05 300.9    F99 327.02       64 y.o. male who presents today for follow up regarding bipolar disorder, insomnia, and CORBY. We have discussed the history and interpreted diagnoses as above as well as the treatment plan below, including potential of risk/benefits/side effects of the recommended regimen of which the patient demonstrates understanding. Patient is agreeable to call 911 or go to the nearest ER should he become concerned for his own safety and/or the safety of those around his. There are no overt changes on exam today compared to most recent evaluation.    Medication regimen: no change, continue lamotrigine 100 mg, continue lithium 600 mg hs, continue trazodone 50 mg hs prn ; patient is advised not to misuse prescribed medications or to use any exogenous substances that aren't disclosed to this provider as they may interact with the regimen to his detriment. Patient is agreeable to plan.  Risk Assessment: protracted risk is moderate, imminent risk is low. Risk factors include: anxiety disorder, mood disorder, and recent/ongoing psychosocial stressors. Protective factors include: intact reality testing, no  access to firearms, no present SI, patient's exhibited future-orientation, strong social support, and patient's cooperation with care. Do note that this is subject to change with the Jew of new stressors, treatment non-adherence, use of substances, and/or new medical ails.  Monitoring: no labs to review, lithium level ordered  Therapy: declined  Follow-up: Return in about 8 weeks (around 5/5/2025).  Treatment plan: due 06/11/25, completed 03/11/25  Communications: will call patient with lithium level results, lithium level 0.6    TREATMENT PLAN/GOALS: challenge patterns of living conducive to symptom burden, implement recommended regimen as above with augmentative, intermittent supportive psychotherapy to reduce symptom burden. Patient acknowledged and verbally consented to continue treatment. The importance of adherence to the recommended treatment and interval follow-up appointments was again emphasized today: patient has good treatment adherence per given history. Patient was today reminded to limit daily caffeine intake, hydrate appropriately, eat healthy and nutritious foods, engage sleep hygiene measures, engage appropriate exposure to sunlight, engage with hobbies in balance with life necessities, and exercise appropriate to their capacity to do so.    Billing: Additionally, I provided 5 minutes of dedicated psychotherapy to the patient, distinct from E/M services, as documented above. Start time: 1145. Stop time: 1150.    Parts of this note are electronic transcriptions/translations of spoken language to printed text using the Dragon Dictation system.    Electronically signed by TRISTIAN Rodney, 03/10/25, 1200 EDT

## 2025-03-11 RX ORDER — LITHIUM CARBONATE 300 MG
TABLET ORAL
Qty: 90 TABLET | Refills: 2 | Status: SHIPPED | OUTPATIENT
Start: 2025-03-11

## 2025-03-11 RX ORDER — LAMOTRIGINE 100 MG/1
100 TABLET, EXTENDED RELEASE ORAL DAILY
Qty: 30 TABLET | Refills: 2 | Status: SHIPPED | OUTPATIENT
Start: 2025-03-11 | End: 2025-06-09

## 2025-03-21 ENCOUNTER — OFFICE VISIT (OUTPATIENT)
Dept: NEUROSURGERY | Facility: CLINIC | Age: 65
End: 2025-03-21
Payer: MEDICARE

## 2025-03-21 VITALS
HEART RATE: 88 BPM | WEIGHT: 233 LBS | BODY MASS INDEX: 28.97 KG/M2 | HEIGHT: 75 IN | DIASTOLIC BLOOD PRESSURE: 80 MMHG | SYSTOLIC BLOOD PRESSURE: 156 MMHG

## 2025-03-21 DIAGNOSIS — M48.061 FORAMINAL STENOSIS OF LUMBAR REGION: ICD-10-CM

## 2025-03-21 DIAGNOSIS — M54.41 CHRONIC BILATERAL LOW BACK PAIN WITH BILATERAL SCIATICA: ICD-10-CM

## 2025-03-21 DIAGNOSIS — R20.0 NUMBNESS AND TINGLING OF BOTH UPPER EXTREMITIES: ICD-10-CM

## 2025-03-21 DIAGNOSIS — M43.16 SPONDYLOLISTHESIS AT L3-L4 LEVEL: ICD-10-CM

## 2025-03-21 DIAGNOSIS — G89.29 CHRONIC BILATERAL LOW BACK PAIN WITH BILATERAL SCIATICA: ICD-10-CM

## 2025-03-21 DIAGNOSIS — G56.22 ULNAR NEUROPATHY AT ELBOW, LEFT: ICD-10-CM

## 2025-03-21 DIAGNOSIS — M54.42 CHRONIC BILATERAL LOW BACK PAIN WITH BILATERAL SCIATICA: ICD-10-CM

## 2025-03-21 DIAGNOSIS — M51.362 DEGENERATION OF INTERVERTEBRAL DISC OF LUMBAR REGION WITH DISCOGENIC BACK PAIN AND LOWER EXTREMITY PAIN: ICD-10-CM

## 2025-03-21 DIAGNOSIS — G56.03 BILATERAL CARPAL TUNNEL SYNDROME: Primary | ICD-10-CM

## 2025-03-21 DIAGNOSIS — G56.21 ULNAR NEUROPATHY AT ELBOW, RIGHT: ICD-10-CM

## 2025-03-21 DIAGNOSIS — R20.2 NUMBNESS AND TINGLING OF BOTH UPPER EXTREMITIES: ICD-10-CM

## 2025-03-21 NOTE — PROGRESS NOTES
Patient being seen for today for Follow-up  .    Subjective    Randy Chaves is a 64 y.o. male that presents with Follow-up  .    HPI  Previously: Last seen on 2/21/2025 for pain in the back and right leg to all the toes.  He had foraminal stenosis on the right to moderate degree at L3-L4 and L5-S1 along with some anterolisthesis of L3 on L4.  We discussed flexion-extension x-rays to assess for instability at L3-L4 which she deferred.  We discussed surgery is less likely to help with moderate stenosis, he was hopeful to avoid surgery.  He was deferring physical therapy.  He was going to think about possible pain management and spinal injections.  He mentioned numbness and tingling in the hands and arms with positive Tinel's testing in the left wrist and there was order for NCV/EMG testing to assess for median or ulnar nerve neuropathy.    Today: He continues to have numbness and tingling in both arms/hands.     reports that he has been smoking cigarettes. He started smoking about 52 years ago. He has a 52.2 pack-year smoking history. He has been exposed to tobacco smoke. He has quit using smokeless tobacco.    Review of Systems   Neurological:  Positive for weakness and numbness.       Objective   Vitals:    03/21/25 1351   BP: 164/96   Pulse: 88        Physical Exam  Constitutional:       Appearance: Normal appearance.   Pulmonary:      Effort: Pulmonary effort is normal.   Musculoskeletal:         General: Tenderness (cervical area) present.      Comments: Mckinney's negative bilaterally   Neurological:      General: No focal deficit present.      Mental Status: He is alert and oriented to person, place, and time.      Sensory: No sensory deficit.      Motor: Weakness (left hand ) present.      Deep Tendon Reflexes: Reflexes normal.   Psychiatric:         Mood and Affect: Mood normal.         Behavior: Behavior normal.          Result Review   I have personally reviewed the NCV/EMG report from 3/4/2025 which  shows evidence of moderate bilateral median nerve neuropathy in the wrists, worse on the right. There is also evidence of mild to moderate bilateral ulnar nerve neuropathy at the elbows.     Assessment and Plan {CC Problem List  Visit Diagnosis  ROS  Review (Popup)  [facility]  Quality  BestPractice  Medications  SmartSets  SnapShot Encounters  Media :23}   Diagnoses and all orders for this visit:    1. Bilateral carpal tunnel syndrome (Primary)    2. Ulnar neuropathy at elbow, left    3. Ulnar neuropathy at elbow, right    4. Numbness and tingling of both upper extremities    5. Degeneration of intervertebral disc of lumbar region with discogenic back pain and lower extremity pain  -     Ambulatory Referral to Pain Management    6. Spondylolisthesis at L3-L4 level  -     Ambulatory Referral to Pain Management    7. Foraminal stenosis of lumbar region  -     Ambulatory Referral to Pain Management    8. Chronic bilateral low back pain with bilateral sciatica  -     Ambulatory Referral to Pain Management    I expect the numbness and tingling of the arms and hands could be related to his bilateral median and ulnar neuropathy.    He may be a candidate for carpal tunnel releases with or without ulnar nerve release.    He will follow-up to discuss with Dr. Licea.    I will refer him to pain management to discuss possible spinal injections for back/leg pain.    Follow Up {Instructions Charge Capture  Follow-up Communications :23}   Return for Next available Thursday to discuss surgery with Dr. Licea.

## 2025-06-30 ENCOUNTER — TELEPHONE (OUTPATIENT)
Dept: UROLOGY | Age: 65
End: 2025-06-30

## 2025-06-30 NOTE — TELEPHONE ENCOUNTER
Patient called in to cancel his new patient appointment today 6/30/25 for elevated PSA due to back pain and not able to walk around well today.    Informed patient we worked him in and would have to check when we could get him R/S.    Please advise?

## 2025-07-15 NOTE — PROGRESS NOTES
Chief Complaint: Benign Prostatic Hypertrophy and Elevated PSA    Subjective         History of Present Illness  Randy Chaves is a 65 y.o. male presents to [facility] to be seen for BPH.    History of Present Illness  The patient is a 65-year-old male here for evaluation of elevated PSA.    He was previously under the care of [facility] but has not visited them recently. He reports frequent urination, difficulty initiating urination, and a weak urinary stream. He does not experience urgency but reports occasional urine leakage. He describes a painful sensation during urination intermittently after straining.       Denies burning or blood in the urine.     He also does not experience any discomfort in the area between his rectum and scrotum.     He wakes up 3 to 4 times at night to urinate.      Has not undergone any surgeries involving the bladder, kidneys, or prostate.     He has not undergone any biopsies.     He was previously prescribed tamsulosin, one capsule daily, but discontinued it due to lack of efficacy.    He has a history of heart problems, including four stents, and takes Xarelto and Plavix daily due to the stents and atrial fibrillation. He has also undergone heart ablation.     SOCIAL HISTORY  The patient smokes a pack a day, sometimes more when stressed.      PSA  6/18/2025 6.83  2/12/2024 4.25      Objective     Past Medical History:   Diagnosis Date    A-fib     Arthritis of back     Atrial fibrillation     Bipolar 1 disorder     Cervical disc disorder     COPD (chronic obstructive pulmonary disease)     Coronary artery disease     Hip arthrosis     Hyperlipidemia     Hypertension     Knee swelling     Rotator cuff syndrome        Past Surgical History:   Procedure Laterality Date    CARDIAC ABLATION      CARDIAC CATHETERIZATION      CARDIAC SURGERY      CORONARY ANGIOPLASTY WITH STENT PLACEMENT      2 stents         Current Outpatient Medications:     albuterol  sulfate  (90 Base) MCG/ACT inhaler, Inhale 2 puffs 4 (Four) Times a Day., Disp: , Rfl:     amLODIPine (NORVASC) 5 MG tablet, Take 1 tablet by mouth Daily., Disp: , Rfl:     Breztri Aerosphere 160-9-4.8 MCG/ACT aerosol inhaler, 2 puffs 2 (Two) Times a Day., Disp: , Rfl:     carvedilol (COREG) 12.5 MG tablet, Take 1 tablet by mouth 2 (Two) Times a Day With Meals., Disp: , Rfl:     clopidogrel (PLAVIX) 75 MG tablet, Take 1 tablet by mouth Daily., Disp: , Rfl:     lamoTRIgine  MG tablet sustained-release 24 hour, Take 1 tablet by mouth Daily for 90 days., Disp: 30 tablet, Rfl: 2    lithium 300 MG tablet, Take one tablet by mouth in the morning and take two tablets by mouth at bedtime, Disp: 90 tablet, Rfl: 2    nitroglycerin (NITROSTAT) 0.4 MG SL tablet, Place 1 tablet under the tongue Every 5 (Five) Minutes As Needed for Chest Pain. Take no more than 3 doses in 15 minutes., Disp: , Rfl:     ranolazine (RANEXA) 500 MG 12 hr tablet, Take 1 tablet by mouth 2 (Two) Times a Day., Disp: , Rfl:     Risankizumab-rzaa,150 MG Dose, (Skyrizi, 150 MG Dose,) 75 MG/0.83ML Prefilled Syringe Kit kit, Inject 1.66 mL under the skin into the appropriate area as directed 1 (One) Time., Disp: , Rfl:     rosuvastatin (CRESTOR) 20 MG tablet, Take 2 tablets by mouth Daily., Disp: , Rfl:     tamsulosin (FLOMAX) 0.4 MG capsule 24 hr capsule, Take 2 capsules by mouth Daily., Disp: 180 capsule, Rfl: 4    traZODone (DESYREL) 50 MG tablet, Take 1 tablet by mouth Every Night for 60 days., Disp: 30 tablet, Rfl: 1    Xarelto 20 MG tablet, Take 1 tablet by mouth Daily With Dinner., Disp: , Rfl:     Allergies   Allergen Reactions    Depakote [Valproic Acid] Anaphylaxis    Depakote [Valproic Acid] Swelling    Simvastatin Unknown - Low Severity     [facility]        Family History   Problem Relation Age of Onset    Hyperlipidemia Mother     Hypertension Mother     Heart attack Father     Hyperlipidemia Father     Hypertension Father      "Heart disease Father     Heart disease Sister     Heart attack Sister        Social History     Socioeconomic History    Marital status:    Tobacco Use    Smoking status: Every Day     Current packs/day: 1.00     Average packs/day: 1 pack/day for 52.5 years (52.5 ttl pk-yrs)     Types: Cigarettes     Start date: 1973     Passive exposure: Current    Smokeless tobacco: Former   Vaping Use    Vaping status: Never Used   Substance and Sexual Activity    Alcohol use: Never    Drug use: Yes     Types: Marijuana     Comment: daily for bipolar    Sexual activity: Defer       Vital Signs:   Resp 14   Ht 190.5 cm (75\")   Wt 106 kg (233 lb 11 oz)   BMI 29.21 kg/m²      Physical Exam  Vitals reviewed.   Constitutional:       Appearance: Normal appearance.   Neurological:      General: No focal deficit present.      Mental Status: He is alert and oriented to person, place, and time.   Psychiatric:         Mood and Affect: Mood normal.         Behavior: Behavior normal.          Result Review :   The following data was reviewed by: TRISTIAN Rodriguez on 06/30/2025:      Bladder Scan interpretation 07/16/2025    Estimation of residual urine via [employer] 3000 Verathon Bladder Scan  MA/nurse performing: Jasmina PAYAN MS  Residual Urine: 59 ml  Indication: Elevated prostate specific antigen (PSA)    Benign prostatic hyperplasia with lower urinary tract symptoms, symptom details unspecified   Position: Supine  Examination: Incremental scanning of the suprapubic area using 2.0 MHz transducer using copious amounts of acoustic gel.   Findings: An anechoic area was demonstrated which represented the bladder, with measurement of residual urine as noted. I inspected this myself. In that the residual urine was stable or insignificant, refer to plan for treatment and plan necessary at this time.            Results        Procedures        Assessment and Plan    Diagnoses and all orders for this visit:    1. Elevated prostate " specific antigen (PSA) (Primary)  -     Bladder Scan  -     MRI Prostate With & Without Contrast; Future    2. Benign prostatic hyperplasia with lower urinary tract symptoms, symptom details unspecified  -     Bladder Scan  -     tamsulosin (FLOMAX) 0.4 MG capsule 24 hr capsule; Take 2 capsules by mouth Daily.  Dispense: 180 capsule; Refill: 4      We will go ahead and order an MRI of the prostate to delineate any underlying etiology of elevated PSA and potentially provide mapping for fusion biopsy.  Assessment & Plan  1. Elevated PSA.  Reports urinary frequency, weak stream, and nocturia, which may be related to an enlarged prostate. Will be started on tamsulosin, taking 2 capsules daily. An MRI of the prostate will be scheduled to assess for any suspicious lesions that may require biopsy.         Follow-up  A follow-up appointment will be scheduled in 1 week to discuss the MRI results and formulate a treatment plan.      Follow Up   Return for 1 week after MRI.  Patient was given instructions and counseling regarding his condition or for health maintenance advice. Please see specific information pulled into the AVS if appropriate.         This document has been electronically signed by TRISTIAN Rodriguez  July 16, 2025 11:27 EDT     Patient or patient representative verbalized consent for the use of Ambient Listening during the visit with  TRISTIAN Rodriguez for chart documentation. 7/16/2025  11:24 EDT

## 2025-07-16 ENCOUNTER — OFFICE VISIT (OUTPATIENT)
Dept: UROLOGY | Age: 65
End: 2025-07-16
Payer: MEDICARE

## 2025-07-16 VITALS — WEIGHT: 233.69 LBS | HEIGHT: 75 IN | RESPIRATION RATE: 14 BRPM | BODY MASS INDEX: 29.06 KG/M2

## 2025-07-16 DIAGNOSIS — R97.20 ELEVATED PROSTATE SPECIFIC ANTIGEN (PSA): Primary | ICD-10-CM

## 2025-07-16 DIAGNOSIS — N40.1 BENIGN PROSTATIC HYPERPLASIA WITH LOWER URINARY TRACT SYMPTOMS, SYMPTOM DETAILS UNSPECIFIED: ICD-10-CM

## 2025-07-16 LAB — URINE VOLUME: 59

## 2025-07-16 RX ORDER — TAMSULOSIN HYDROCHLORIDE 0.4 MG/1
2 CAPSULE ORAL DAILY
Qty: 180 CAPSULE | Refills: 4 | Status: SHIPPED | OUTPATIENT
Start: 2025-07-16

## 2025-07-16 RX ORDER — BUDESONIDE, GLYCOPYRROLATE, AND FORMOTEROL FUMARATE 160; 9; 4.8 UG/1; UG/1; UG/1
2 AEROSOL, METERED RESPIRATORY (INHALATION) 2 TIMES DAILY
COMMUNITY
Start: 2025-06-02

## 2025-07-17 ENCOUNTER — OFFICE VISIT (OUTPATIENT)
Dept: NEUROSURGERY | Facility: CLINIC | Age: 65
End: 2025-07-17
Payer: MEDICARE

## 2025-07-17 VITALS
SYSTOLIC BLOOD PRESSURE: 130 MMHG | DIASTOLIC BLOOD PRESSURE: 85 MMHG | HEIGHT: 75 IN | WEIGHT: 226.8 LBS | BODY MASS INDEX: 28.2 KG/M2

## 2025-07-17 DIAGNOSIS — M54.42 CHRONIC BILATERAL LOW BACK PAIN WITH BILATERAL SCIATICA: ICD-10-CM

## 2025-07-17 DIAGNOSIS — G89.29 CHRONIC BILATERAL LOW BACK PAIN WITH BILATERAL SCIATICA: ICD-10-CM

## 2025-07-17 DIAGNOSIS — M54.41 CHRONIC BILATERAL LOW BACK PAIN WITH BILATERAL SCIATICA: ICD-10-CM

## 2025-07-17 DIAGNOSIS — G56.03 BILATERAL CARPAL TUNNEL SYNDROME: Primary | ICD-10-CM

## 2025-07-17 NOTE — PROGRESS NOTES
Randy Chaves is a 65 y.o. male that presents with bilateral carpal tunnel syndrome     Bilateral hand numbness. Nof improved since last visit.     History of Present Illness  The patient presents for evaluation of right-sided carpal tunnel syndrome, atrial fibrillation, and chronic hip and lower back pain.    He reports no tingling or numbness in his hand and expresses interest in addressing the right-sided carpal tunnel syndrome first. The patient has a history of atrial fibrillation and has undergone heart ablation. His treatment regimen includes Xarelto and Plavix. He was advised against discontinuing Xarelto due to its role in stroke prevention. Additionally, he has four stents in place, with the most recent one being inserted a few years ago.    Severe hip and lower back pain, which is constant and debilitating, is also reported. He needs to consult with a doctor regarding his hips and back. The pain management specialist he was referred to does not accept his insurance.    Past Surgical History: Heart ablation; stent placement    Past Medical History: Atrial fibrillation     Review of Systems   Musculoskeletal:  Positive for myalgias.        Vitals:    07/17/25 1441   BP: 130/85        Physical Exam  Constitutional:       Appearance: He is normal weight.   Cardiovascular:      Comments: No notable edema   Pulmonary:      Effort: Pulmonary effort is normal.   Neurological:      Mental Status: He is alert.      Motor: Weakness (mild OPB weakness) present.      Comments: -Tinel's  -Phalen's   Psychiatric:         Mood and Affect: Mood normal.           Assessment and Plan {CC Problem List  Visit Diagnosis  ROS  Review (Popup)  Health Maintenance  Quality  BestPractice  Medications  SmartSets  SnapShot Encounters  Media :23}   Problem List Items Addressed This Visit       Bilateral carpal tunnel syndrome - Primary    Relevant Orders    Case Request (Completed)    Follow Anesthesia Guidelines /  Protocol     Other Visit Diagnoses         Chronic bilateral low back pain with bilateral sciatica        Relevant Orders    Ambulatory Referral to Pain Management Clinic (Completed)            Assessment & Plan  1. Right-sided carpal tunnel syndrome.     - A tentative date for the carpal tunnel release surgery will be scheduled.     - Consultation with the cardiologist will be conducted to ensure the safety of temporarily discontinuing blood thinners, Xarelto and Plavix, for the procedure.     - Upon receiving cardiologist approval, the patient will be informed of the surgery date and the specific timeline for stopping the medications.    2. Atrial fibrillation.     - The patient is currently on Xarelto for atrial fibrillation and Plavix due to having four stents.     - The cardiologist will be consulted to confirm the safety of temporarily discontinuing these medications for the carpal tunnel release surgery.    3. Chronic hip and lower back pain.     - A new referral to a different pain management specialist who accepts [facility] [employer] insurance will be made.       Follow Up {Instructions Charge Capture  Follow-up Communications :23}   No follow-ups on file.      Patient or patient representative verbalized consent for the use of Ambient Listening during the visit with  Marciano Licea MD for chart documentation. 7/17/2025  15:01 EDT

## 2025-07-23 ENCOUNTER — TELEPHONE (OUTPATIENT)
Dept: NEUROSURGERY | Facility: CLINIC | Age: 65
End: 2025-07-23
Payer: OTHER GOVERNMENT

## 2025-07-23 NOTE — TELEPHONE ENCOUNTER
Received cardiac/medication clearance to schedule carpal tunnel release with Dr. Licea. Contacted patient, surgery scheduled for 8-11-25. Instructed patient on medications, he verbalized understanding. Instructions also mailed to patient's home address.

## 2025-07-25 ENCOUNTER — TELEPHONE (OUTPATIENT)
Dept: NEUROSURGERY | Facility: CLINIC | Age: 65
End: 2025-07-25
Payer: OTHER GOVERNMENT

## 2025-07-25 NOTE — TELEPHONE ENCOUNTER
Received message from financial clearance asking if patient will be using [facility] or [facility] for upcoming surgery with Dr. Licea on 8-11-25. Our office only has [facility]. Attempted to contact patient, no answer and unable to leave voicemail. Will try back again later.

## 2025-08-08 ENCOUNTER — ANESTHESIA EVENT (OUTPATIENT)
Dept: PERIOP | Facility: HOSPITAL | Age: 65
End: 2025-08-08
Payer: MEDICARE

## 2025-08-11 ENCOUNTER — ANESTHESIA (OUTPATIENT)
Dept: PERIOP | Facility: HOSPITAL | Age: 65
End: 2025-08-11
Payer: MEDICARE

## 2025-08-11 ENCOUNTER — HOSPITAL ENCOUNTER (OUTPATIENT)
Facility: HOSPITAL | Age: 65
Setting detail: HOSPITAL OUTPATIENT SURGERY
Discharge: HOME | End: 2025-08-11
Attending: NEUROLOGICAL SURGERY | Admitting: NEUROLOGICAL SURGERY
Payer: MEDICARE

## 2025-08-11 VITALS
HEIGHT: 75 IN | RESPIRATION RATE: 18 BRPM | HEART RATE: 66 BPM | BODY MASS INDEX: 28.45 KG/M2 | SYSTOLIC BLOOD PRESSURE: 124 MMHG | OXYGEN SATURATION: 97 % | DIASTOLIC BLOOD PRESSURE: 82 MMHG | WEIGHT: 228.84 LBS | TEMPERATURE: 97.2 F

## 2025-08-11 DIAGNOSIS — G56.03 BILATERAL CARPAL TUNNEL SYNDROME: ICD-10-CM

## 2025-08-11 PROCEDURE — 25810000003 LACTATED RINGERS PER 1000 ML: Performed by: ANESTHESIOLOGY

## 2025-08-11 PROCEDURE — 25010000002 PHENYLEPHRINE HCL-NACL 1000-0.9 MCG/10ML-% SOLUTION PREFILLED SYRINGE

## 2025-08-11 PROCEDURE — 25010000002 ONDANSETRON PER 1 MG

## 2025-08-11 PROCEDURE — 25010000002 LIDOCAINE PF 2% 2 % SOLUTION

## 2025-08-11 PROCEDURE — 25010000002 HYDROMORPHONE 1 MG/ML SOLUTION

## 2025-08-11 PROCEDURE — 25010000002 CEFAZOLIN PER 500 MG: Performed by: NEUROLOGICAL SURGERY

## 2025-08-11 PROCEDURE — 25010000002 MIDAZOLAM PER 1MG: Performed by: ANESTHESIOLOGY

## 2025-08-11 PROCEDURE — 25010000002 DEXAMETHASONE PER 1 MG

## 2025-08-11 PROCEDURE — 25010000002 LIDOCAINE 1 % SOLUTION: Performed by: NEUROLOGICAL SURGERY

## 2025-08-11 PROCEDURE — 25010000002 PROPOFOL 10 MG/ML EMULSION

## 2025-08-11 RX ORDER — HYDROCODONE BITARTRATE AND ACETAMINOPHEN 5; 325 MG/1; MG/1
1 TABLET ORAL EVERY 8 HOURS PRN
Qty: 10 TABLET | Refills: 0 | Status: SHIPPED | OUTPATIENT
Start: 2025-08-11

## 2025-08-11 RX ORDER — PROMETHAZINE HYDROCHLORIDE 25 MG/1
25 TABLET ORAL ONCE AS NEEDED
Status: DISCONTINUED | OUTPATIENT
Start: 2025-08-11 | End: 2025-08-11 | Stop reason: HOSPADM

## 2025-08-11 RX ORDER — PROMETHAZINE HYDROCHLORIDE 25 MG/1
25 SUPPOSITORY RECTAL ONCE AS NEEDED
Status: DISCONTINUED | OUTPATIENT
Start: 2025-08-11 | End: 2025-08-11 | Stop reason: HOSPADM

## 2025-08-11 RX ORDER — CARVEDILOL 6.25 MG/1
12.5 TABLET ORAL ONCE
Status: COMPLETED | OUTPATIENT
Start: 2025-08-11 | End: 2025-08-11

## 2025-08-11 RX ORDER — SODIUM CHLORIDE, SODIUM LACTATE, POTASSIUM CHLORIDE, CALCIUM CHLORIDE 600; 310; 30; 20 MG/100ML; MG/100ML; MG/100ML; MG/100ML
20 INJECTION, SOLUTION INTRAVENOUS CONTINUOUS PRN
Status: DISCONTINUED | OUTPATIENT
Start: 2025-08-11 | End: 2025-08-11 | Stop reason: HOSPADM

## 2025-08-11 RX ORDER — DEXMEDETOMIDINE HYDROCHLORIDE 100 UG/ML
INJECTION, SOLUTION INTRAVENOUS AS NEEDED
Status: DISCONTINUED | OUTPATIENT
Start: 2025-08-11 | End: 2025-08-11 | Stop reason: SURG

## 2025-08-11 RX ORDER — MIDAZOLAM HYDROCHLORIDE 2 MG/2ML
2 INJECTION, SOLUTION INTRAMUSCULAR; INTRAVENOUS ONCE
Status: COMPLETED | OUTPATIENT
Start: 2025-08-11 | End: 2025-08-11

## 2025-08-11 RX ORDER — OXYCODONE HYDROCHLORIDE 5 MG/1
5 TABLET ORAL
Status: DISCONTINUED | OUTPATIENT
Start: 2025-08-11 | End: 2025-08-11 | Stop reason: HOSPADM

## 2025-08-11 RX ORDER — GINSENG 100 MG
CAPSULE ORAL AS NEEDED
Status: DISCONTINUED | OUTPATIENT
Start: 2025-08-11 | End: 2025-08-11 | Stop reason: HOSPADM

## 2025-08-11 RX ORDER — DEXAMETHASONE SODIUM PHOSPHATE 4 MG/ML
INJECTION, SOLUTION INTRA-ARTICULAR; INTRALESIONAL; INTRAMUSCULAR; INTRAVENOUS; SOFT TISSUE AS NEEDED
Status: DISCONTINUED | OUTPATIENT
Start: 2025-08-11 | End: 2025-08-11 | Stop reason: SURG

## 2025-08-11 RX ORDER — ONDANSETRON 2 MG/ML
4 INJECTION INTRAMUSCULAR; INTRAVENOUS ONCE AS NEEDED
Status: DISCONTINUED | OUTPATIENT
Start: 2025-08-11 | End: 2025-08-11 | Stop reason: HOSPADM

## 2025-08-11 RX ORDER — PHENYLEPHRINE HCL IN 0.9% NACL 1 MG/10 ML
SYRINGE (ML) INTRAVENOUS AS NEEDED
Status: DISCONTINUED | OUTPATIENT
Start: 2025-08-11 | End: 2025-08-11 | Stop reason: SURG

## 2025-08-11 RX ORDER — LIDOCAINE HYDROCHLORIDE 20 MG/ML
INJECTION, SOLUTION EPIDURAL; INFILTRATION; INTRACAUDAL; PERINEURAL AS NEEDED
Status: DISCONTINUED | OUTPATIENT
Start: 2025-08-11 | End: 2025-08-11 | Stop reason: SURG

## 2025-08-11 RX ORDER — LIDOCAINE HYDROCHLORIDE 10 MG/ML
INJECTION, SOLUTION INFILTRATION; PERINEURAL AS NEEDED
Status: DISCONTINUED | OUTPATIENT
Start: 2025-08-11 | End: 2025-08-11 | Stop reason: HOSPADM

## 2025-08-11 RX ORDER — MAGNESIUM HYDROXIDE 1200 MG/15ML
LIQUID ORAL AS NEEDED
Status: DISCONTINUED | OUTPATIENT
Start: 2025-08-11 | End: 2025-08-11 | Stop reason: HOSPADM

## 2025-08-11 RX ORDER — ACETAMINOPHEN 500 MG
1000 TABLET ORAL ONCE
Status: COMPLETED | OUTPATIENT
Start: 2025-08-11 | End: 2025-08-11

## 2025-08-11 RX ORDER — ONDANSETRON 2 MG/ML
INJECTION INTRAMUSCULAR; INTRAVENOUS AS NEEDED
Status: DISCONTINUED | OUTPATIENT
Start: 2025-08-11 | End: 2025-08-11 | Stop reason: SURG

## 2025-08-11 RX ORDER — PROPOFOL 10 MG/ML
VIAL (ML) INTRAVENOUS AS NEEDED
Status: DISCONTINUED | OUTPATIENT
Start: 2025-08-11 | End: 2025-08-11 | Stop reason: SURG

## 2025-08-11 RX ADMIN — CARVEDILOL 12.5 MG: 6.25 TABLET, FILM COATED ORAL at 06:59

## 2025-08-11 RX ADMIN — ACETAMINOPHEN 1000 MG: 500 TABLET, FILM COATED ORAL at 06:49

## 2025-08-11 RX ADMIN — MIDAZOLAM HYDROCHLORIDE 2 MG: 1 INJECTION, SOLUTION INTRAMUSCULAR; INTRAVENOUS at 07:15

## 2025-08-11 RX ADMIN — PROPOFOL 160 MG: 10 INJECTION, EMULSION INTRAVENOUS at 07:24

## 2025-08-11 RX ADMIN — HYDROMORPHONE HYDROCHLORIDE 0.5 MG: 1 INJECTION, SOLUTION INTRAMUSCULAR; INTRAVENOUS; SUBCUTANEOUS at 07:38

## 2025-08-11 RX ADMIN — SODIUM CHLORIDE 2 G: 9 INJECTION, SOLUTION INTRAVENOUS at 07:30

## 2025-08-11 RX ADMIN — ONDANSETRON 4 MG: 2 INJECTION INTRAMUSCULAR; INTRAVENOUS at 07:35

## 2025-08-11 RX ADMIN — DEXMEDETOMIDINE 4 MCG: 100 INJECTION, SOLUTION INTRAVENOUS at 07:49

## 2025-08-11 RX ADMIN — SODIUM CHLORIDE, POTASSIUM CHLORIDE, SODIUM LACTATE AND CALCIUM CHLORIDE 20 ML/HR: 600; 310; 30; 20 INJECTION, SOLUTION INTRAVENOUS at 06:48

## 2025-08-11 RX ADMIN — Medication 100 MCG: at 07:24

## 2025-08-11 RX ADMIN — LIDOCAINE HYDROCHLORIDE 100 MG: 20 INJECTION, SOLUTION EPIDURAL; INFILTRATION; INTRACAUDAL; PERINEURAL at 07:24

## 2025-08-11 RX ADMIN — DEXAMETHASONE SODIUM PHOSPHATE 4 MG: 4 INJECTION, SOLUTION INTRAMUSCULAR; INTRAVENOUS at 07:35

## 2025-08-11 RX ADMIN — HYDROMORPHONE HYDROCHLORIDE 0.5 MG: 1 INJECTION, SOLUTION INTRAMUSCULAR; INTRAVENOUS; SUBCUTANEOUS at 07:24

## 2025-08-26 ENCOUNTER — OFFICE VISIT (OUTPATIENT)
Dept: NEUROSURGERY | Facility: CLINIC | Age: 65
End: 2025-08-26
Payer: MEDICARE

## 2025-08-26 VITALS
SYSTOLIC BLOOD PRESSURE: 132 MMHG | BODY MASS INDEX: 26.73 KG/M2 | HEART RATE: 70 BPM | WEIGHT: 215 LBS | DIASTOLIC BLOOD PRESSURE: 70 MMHG | HEIGHT: 75 IN

## 2025-08-26 DIAGNOSIS — Z98.890 STATUS POST CARPAL TUNNEL RELEASE: ICD-10-CM

## 2025-08-26 DIAGNOSIS — M54.41 CHRONIC BILATERAL LOW BACK PAIN WITH BILATERAL SCIATICA: ICD-10-CM

## 2025-08-26 DIAGNOSIS — G56.03 BILATERAL CARPAL TUNNEL SYNDROME: Primary | ICD-10-CM

## 2025-08-26 DIAGNOSIS — M54.42 CHRONIC BILATERAL LOW BACK PAIN WITH BILATERAL SCIATICA: ICD-10-CM

## 2025-08-26 DIAGNOSIS — G89.29 CHRONIC BILATERAL LOW BACK PAIN WITH BILATERAL SCIATICA: ICD-10-CM

## 2025-08-26 PROCEDURE — 99024 POSTOP FOLLOW-UP VISIT: CPT | Performed by: PHYSICIAN ASSISTANT

## (undated) DEVICE — STERILE POLYISOPRENE POWDER-FREE SURGICAL GLOVES WITH EMOLLIENT COATING: Brand: PROTEXIS

## (undated) DEVICE — PCH INST SURG INVISISHIELD 2PCKT

## (undated) DEVICE — GLV SURG BIOGEL LTX PF 7 1/2

## (undated) DEVICE — SYR LL TP 10ML STRL

## (undated) DEVICE — PAD,NON-ADHERENT,3X4,STERILE,LF,1/PK: Brand: CURAD

## (undated) DEVICE — INTENDED FOR TISSUE SEPARATION, AND OTHER PROCEDURES THAT REQUIRE A SHARP SURGICAL BLADE TO PUNCTURE OR CUT.: Brand: BARD-PARKER ® CARBON RIB-BACK BLADES

## (undated) DEVICE — SUT ETHLN 3-0 FS118IN 663H

## (undated) DEVICE — SUT VIC 2/0 SH 27IN

## (undated) DEVICE — BANDAGE,GAUZE,BULKEE II,4.5"X4.1YD,STRL: Brand: MEDLINE

## (undated) DEVICE — EXTREMITY-LF: Brand: MEDLINE INDUSTRIES, INC.

## (undated) DEVICE — GAMMEX® NON-LATEX SIZE 7.5, STERILE NEOPRENE POWDER-FREE SURGICAL GLOVE: Brand: GAMMEX

## (undated) DEVICE — BANDAGE,ELASTIC,SOFTWRAP,STERILE,4"X5YD: Brand: MEDLINE

## (undated) DEVICE — GAUZE,SPONGE,4"X4",16PLY,STRL,LF,10/TRAY: Brand: MEDLINE

## (undated) DEVICE — APPL CHLORAPREP HI/LITE 26ML ORNG

## (undated) DEVICE — HYPODERMIC SAFETY NEEDLE: Brand: MONOJECT

## (undated) DEVICE — THE STERILE LIGHT HANDLE COVER IS USED WITH STERIS SURGICAL LIGHTING AND VISUALIZATION SYSTEMS.